# Patient Record
Sex: FEMALE | Race: WHITE | NOT HISPANIC OR LATINO | Employment: UNEMPLOYED | URBAN - METROPOLITAN AREA
[De-identification: names, ages, dates, MRNs, and addresses within clinical notes are randomized per-mention and may not be internally consistent; named-entity substitution may affect disease eponyms.]

---

## 2017-03-09 ENCOUNTER — HOSPITAL ENCOUNTER (EMERGENCY)
Facility: HOSPITAL | Age: 42
Discharge: LEFT BEFORE DISCHARGE | End: 2017-03-09

## 2017-03-09 VITALS
RESPIRATION RATE: 17 BRPM | TEMPERATURE: 99.5 F | BODY MASS INDEX: 32.93 KG/M2 | HEIGHT: 70 IN | DIASTOLIC BLOOD PRESSURE: 92 MMHG | HEART RATE: 120 BPM | SYSTOLIC BLOOD PRESSURE: 133 MMHG | WEIGHT: 230 LBS | OXYGEN SATURATION: 98 %

## 2017-03-09 PROCEDURE — 99211 OFF/OP EST MAY X REQ PHY/QHP: CPT

## 2017-03-09 RX ORDER — LAMOTRIGINE 100 MG/1
150 TABLET ORAL DAILY
COMMUNITY
End: 2017-07-06 | Stop reason: SDUPTHER

## 2017-03-09 RX ORDER — HYDROXYZINE PAMOATE 50 MG/1
50 CAPSULE ORAL 3 TIMES DAILY PRN
COMMUNITY

## 2017-03-09 NOTE — ED NOTES
States was cleaning her old medications out of medication cabinet last night and accidentally threw away her Haldol. Missed her noon dose today and needs it filled      Charissa Escobar RN  03/09/17 9201

## 2017-05-10 ENCOUNTER — OFFICE VISIT (OUTPATIENT)
Dept: FAMILY MEDICINE CLINIC | Facility: CLINIC | Age: 42
End: 2017-05-10

## 2017-05-10 VITALS
SYSTOLIC BLOOD PRESSURE: 130 MMHG | DIASTOLIC BLOOD PRESSURE: 76 MMHG | WEIGHT: 244 LBS | BODY MASS INDEX: 34.93 KG/M2 | HEART RATE: 105 BPM | HEIGHT: 70 IN | OXYGEN SATURATION: 98 % | RESPIRATION RATE: 16 BRPM | TEMPERATURE: 98.5 F

## 2017-05-10 DIAGNOSIS — L73.2 HYDRADENITIS: Primary | ICD-10-CM

## 2017-05-10 DIAGNOSIS — L03.90 CELLULITIS, UNSPECIFIED CELLULITIS SITE: ICD-10-CM

## 2017-05-10 PROCEDURE — 99213 OFFICE O/P EST LOW 20 MIN: CPT | Performed by: NURSE PRACTITIONER

## 2017-05-10 RX ORDER — LITHIUM CARBONATE 300 MG/1
TABLET, FILM COATED, EXTENDED RELEASE ORAL
Refills: 3 | COMMUNITY
Start: 2017-04-10 | End: 2017-07-06 | Stop reason: SDUPTHER

## 2017-05-10 RX ORDER — AMOXICILLIN AND CLAVULANATE POTASSIUM 875; 125 MG/1; MG/1
1 TABLET, FILM COATED ORAL 2 TIMES DAILY
Qty: 20 TABLET | Refills: 0 | Status: SHIPPED | OUTPATIENT
Start: 2017-05-10 | End: 2017-05-20

## 2017-05-10 RX ORDER — METHYLPREDNISOLONE 4 MG/1
TABLET ORAL
Qty: 21 TABLET | Refills: 0 | Status: SHIPPED | OUTPATIENT
Start: 2017-05-10 | End: 2017-07-06

## 2017-05-16 ENCOUNTER — HOSPITAL ENCOUNTER (EMERGENCY)
Facility: HOSPITAL | Age: 42
Discharge: LEFT BEFORE DISCHARGE | End: 2017-05-16

## 2017-05-16 VITALS
SYSTOLIC BLOOD PRESSURE: 149 MMHG | WEIGHT: 235 LBS | RESPIRATION RATE: 20 BRPM | TEMPERATURE: 98.7 F | BODY MASS INDEX: 33.64 KG/M2 | DIASTOLIC BLOOD PRESSURE: 100 MMHG | OXYGEN SATURATION: 98 % | HEART RATE: 131 BPM | HEIGHT: 70 IN

## 2017-05-16 PROCEDURE — 99211 OFF/OP EST MAY X REQ PHY/QHP: CPT

## 2017-05-16 RX ORDER — SODIUM CHLORIDE 0.9 % (FLUSH) 0.9 %
10 SYRINGE (ML) INJECTION AS NEEDED
Status: DISCONTINUED | OUTPATIENT
Start: 2017-05-16 | End: 2017-05-17 | Stop reason: HOSPADM

## 2017-07-06 ENCOUNTER — HOSPITAL ENCOUNTER (EMERGENCY)
Facility: HOSPITAL | Age: 42
Discharge: PSYCHIATRIC OR SUBSTANCE USE FACILITY | End: 2017-07-07
Attending: FAMILY MEDICINE | Admitting: FAMILY MEDICINE

## 2017-07-06 DIAGNOSIS — T43.502A: Primary | ICD-10-CM

## 2017-07-06 DIAGNOSIS — F33.2 SEVERE EPISODE OF RECURRENT MAJOR DEPRESSIVE DISORDER, WITHOUT PSYCHOTIC FEATURES (HCC): ICD-10-CM

## 2017-07-06 LAB
ALBUMIN SERPL-MCNC: 4.7 G/DL (ref 3.5–5.2)
ALBUMIN/GLOB SERPL: 2 G/DL
ALP SERPL-CCNC: 74 U/L (ref 39–117)
ALT SERPL W P-5'-P-CCNC: 16 U/L (ref 1–33)
AMPHET+METHAMPHET UR QL: NEGATIVE
ANION GAP SERPL CALCULATED.3IONS-SCNC: 17.4 MMOL/L
APAP SERPL-MCNC: <5 MCG/ML (ref 10–30)
AST SERPL-CCNC: 10 U/L (ref 1–32)
BARBITURATES UR QL SCN: NEGATIVE
BASOPHILS # BLD AUTO: 0.03 10*3/MM3 (ref 0–0.2)
BASOPHILS NFR BLD AUTO: 0.3 % (ref 0–1.5)
BENZODIAZ UR QL SCN: NEGATIVE
BILIRUB SERPL-MCNC: 0.2 MG/DL (ref 0.1–1.2)
BUN BLD-MCNC: 21 MG/DL (ref 6–20)
BUN/CREAT SERPL: 19.4 (ref 7–25)
CALCIUM SPEC-SCNC: 9.7 MG/DL (ref 8.6–10.5)
CANNABINOIDS SERPL QL: NEGATIVE
CHLORIDE SERPL-SCNC: 101 MMOL/L (ref 98–107)
CO2 SERPL-SCNC: 18.6 MMOL/L (ref 22–29)
COCAINE UR QL: NEGATIVE
CREAT BLD-MCNC: 1.08 MG/DL (ref 0.57–1)
DEPRECATED RDW RBC AUTO: 43.8 FL (ref 37–54)
EOSINOPHIL # BLD AUTO: 0.22 10*3/MM3 (ref 0–0.7)
EOSINOPHIL NFR BLD AUTO: 2.3 % (ref 0.3–6.2)
ERYTHROCYTE [DISTWIDTH] IN BLOOD BY AUTOMATED COUNT: 14.5 % (ref 11.7–13)
ETHANOL BLD-MCNC: <10 MG/DL (ref 0–10)
ETHANOL UR QL: <0.01 %
GFR SERPL CREATININE-BSD FRML MDRD: 55 ML/MIN/1.73
GLOBULIN UR ELPH-MCNC: 2.3 GM/DL
GLUCOSE BLD-MCNC: 121 MG/DL (ref 65–99)
HCG SERPL QL: NEGATIVE
HCT VFR BLD AUTO: 40.4 % (ref 35.6–45.5)
HGB BLD-MCNC: 13.6 G/DL (ref 11.9–15.5)
HOLD SPECIMEN: NORMAL
IMM GRANULOCYTES # BLD: 0.03 10*3/MM3 (ref 0–0.03)
IMM GRANULOCYTES NFR BLD: 0.3 % (ref 0–0.5)
LITHIUM SERPL-SCNC: 0.5 MMOL/L (ref 0.6–1.2)
LYMPHOCYTES # BLD AUTO: 2.82 10*3/MM3 (ref 0.9–4.8)
LYMPHOCYTES NFR BLD AUTO: 29.1 % (ref 19.6–45.3)
MCH RBC QN AUTO: 27.8 PG (ref 26.9–32)
MCHC RBC AUTO-ENTMCNC: 33.7 G/DL (ref 32.4–36.3)
MCV RBC AUTO: 82.4 FL (ref 80.5–98.2)
METHADONE UR QL SCN: NEGATIVE
MONOCYTES # BLD AUTO: 0.63 10*3/MM3 (ref 0.2–1.2)
MONOCYTES NFR BLD AUTO: 6.5 % (ref 5–12)
NEUTROPHILS # BLD AUTO: 5.96 10*3/MM3 (ref 1.9–8.1)
NEUTROPHILS NFR BLD AUTO: 61.5 % (ref 42.7–76)
OPIATES UR QL: NEGATIVE
OXYCODONE UR QL SCN: NEGATIVE
PLATELET # BLD AUTO: 243 10*3/MM3 (ref 140–500)
PMV BLD AUTO: 10.2 FL (ref 6–12)
POTASSIUM BLD-SCNC: 3.5 MMOL/L (ref 3.5–5.2)
PROT SERPL-MCNC: 7 G/DL (ref 6–8.5)
RBC # BLD AUTO: 4.9 10*6/MM3 (ref 3.9–5.2)
SALICYLATES SERPL-MCNC: <0.3 MG/DL
SODIUM BLD-SCNC: 137 MMOL/L (ref 136–145)
VALPROATE SERPL-MCNC: <2.8 MCG/ML (ref 50–125)
WBC NRBC COR # BLD: 9.69 10*3/MM3 (ref 4.5–10.7)
WHOLE BLOOD HOLD SPECIMEN: NORMAL
WHOLE BLOOD HOLD SPECIMEN: NORMAL

## 2017-07-06 PROCEDURE — 93010 ELECTROCARDIOGRAM REPORT: CPT | Performed by: INTERNAL MEDICINE

## 2017-07-06 RX ORDER — LITHIUM CARBONATE 300 MG/1
300 TABLET, FILM COATED, EXTENDED RELEASE ORAL 3 TIMES DAILY
COMMUNITY
End: 2021-05-17

## 2017-07-06 RX ORDER — LITHIUM CARBONATE 300 MG/1
300 TABLET, FILM COATED, EXTENDED RELEASE ORAL 2 TIMES DAILY
COMMUNITY
End: 2017-07-06

## 2017-07-06 RX ORDER — HALOPERIDOL 5 MG/1
5 TABLET ORAL DAILY
Status: ON HOLD | COMMUNITY
End: 2017-07-07

## 2017-07-06 RX ORDER — LAMOTRIGINE 100 MG/1
150 TABLET ORAL EVERY MORNING
COMMUNITY
End: 2021-05-17 | Stop reason: SDUPTHER

## 2017-07-06 RX ORDER — CHLORPROMAZINE HYDROCHLORIDE 50 MG/1
50 TABLET, FILM COATED ORAL 3 TIMES DAILY
COMMUNITY
End: 2019-06-11

## 2017-07-06 RX ORDER — SODIUM CHLORIDE 0.9 % (FLUSH) 0.9 %
10 SYRINGE (ML) INJECTION AS NEEDED
Status: DISCONTINUED | OUTPATIENT
Start: 2017-07-06 | End: 2017-07-07 | Stop reason: HOSPADM

## 2017-07-06 RX ORDER — HALOPERIDOL 10 MG/1
10 TABLET ORAL DAILY
Status: ON HOLD | COMMUNITY
End: 2017-07-07

## 2017-07-06 RX ADMIN — SODIUM CHLORIDE 1000 ML: 9 INJECTION, SOLUTION INTRAVENOUS at 18:20

## 2017-07-06 NOTE — ED PROVIDER NOTES
Pt presents to the ED with a drug overdose onset PTA.  Pt states she took 90 50mg of thorozene at 1700. Pt denies being nauseated. She states she cannot move and can barely talk, but actually is able to give a fairly good history.  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 Her  expresses surprise at the turn of events.  Past History: Schizo affective disorder.  Soc history: Smokes.  Occasional ETOH.  ROS: negative   Physical Exam: pt is tachycardic with a good BP.  She has clear lungs and benign abdomen and no peripheral edema.  Her neuro exam is non focal with mild sedation.    EKG           EKG time: 1805  Rhythm/Rate: sinus tachycardia  P waves and WY: nml  QRS, axis: RAD   ST and T waves: non specific ST and T changes     Patient observed and remained stable.  Due to sedation, charcoal was not given.  IVF's were.    She has steadily improved and Psych here to evaluate her depression (0213).      Diagnosis: 1)Depression, severe, recurrent, without psychosis                    2)Overdose of antipsychotic, intentional    Disposition: Psych to try to transfer to [facility] at patient's request.    0240: Care turned over to Dr Vital.        Interpreted Contemporaneously by me, independently viewed  unchanged compared to prior 7/19/2016    I supervised care provided by the midlevel provider.    We have discussed this patient's history, physical exam, and treatment plan.   I have reviewed the note and personally saw and examined the patient and agree with the plan of care.  Documentation assistance provided by bridgett Figueroa.  Information recorded by the scribe was done at my direction and has been verified and validated by me.         Esther Figueroa  07/06/17 3547       Noel Enciso MD  07/07/17 0154       Noel Enciso MD  07/07/17 0208       Noel Enciso MD  07/07/17 0242

## 2017-07-06 NOTE — ED NOTES
Pt has bipolar and schizophrenia, pt states she that she isolates herself and just becomes depressed, pt stated it is hard to manage her disease and she is tired of going to the hospital for treatment       Agnes Ramirez RN  07/06/17 2216

## 2017-07-06 NOTE — ED PROVIDER NOTES
EMERGENCY DEPARTMENT ENCOUNTER    CHIEF COMPLAINT  Chief Complaint: Drug Overdose  History given by: Patient  History limited by:   Room Number: 03/03  PMD: Bhavik Aguilera MD      HPI:  Pt is a 41 y.o. female who presents complaining of ***    Duration:  ***  Onset: ***  Timing: ***  Location: ***  Radiation: ***  Quality: ***  Intensity/Severity: ***  Progression: ***  Associated Symptoms: ***  Aggravating Factors: ***  Alleviating Factors: ***  Previous Episodes: ***  Treatment before arrival: ***    PAST MEDICAL HISTORY  Active Ambulatory Problems     Diagnosis Date Noted   • Acute paranoia 04/12/2016   • Bipolar affective disorder, currently active 04/12/2016   • Paranoid schizophrenia 08/17/2016     Resolved Ambulatory Problems     Diagnosis Date Noted   • No Resolved Ambulatory Problems     Past Medical History:   Diagnosis Date   • Anxiety    • Bipolar disorder    • Depression    • GERD (gastroesophageal reflux disease)    • Hidradenitis    • Schizo affective schizophrenia        PAST SURGICAL HISTORY  Past Surgical History:   Procedure Laterality Date   • CHOLECYSTECTOMY     • CYST REMOVAL     • LIPOMA EXCISION         FAMILY HISTORY  Family History   Problem Relation Age of Onset   • Cancer Mother    • Hypertension Father        SOCIAL HISTORY  Social History     Social History   • Marital status:      Spouse name: N/A   • Number of children: N/A   • Years of education: N/A     Occupational History   • Not on file.     Social History Main Topics   • Smoking status: Current Every Day Smoker     Packs/day: 1.00   • Smokeless tobacco: Never Used   • Alcohol use No   • Drug use: No   • Sexual activity: Defer     Other Topics Concern   • Not on file     Social History Narrative       ALLERGIES  Review of patient's allergies indicates no known allergies.    REVIEW OF SYSTEMS  Review of Systems    PHYSICAL EXAM  ED Triage Vitals   Temp Heart Rate Resp BP SpO2   07/06/17 1759 07/06/17 1759 07/06/17 1759  07/06/17 1759 07/06/17 1759   99 °F (37.2 °C) 145 16 135/85 99 %      Temp src Heart Rate Source Patient Position BP Location FiO2 (%)   -- -- -- -- --              Physical Exam    LAB RESULTS  Lab Results (last 24 hours)     ** No results found for the last 24 hours. **          I ordered the above labs and reviewed the results    RADIOLOGY  No orders to display        I ordered the above noted radiological studies. Interpreted by radiologist. Discussed with radiologist (***). Reviewed by me in PACS.       PROCEDURES  Procedures      PROGRESS AND CONSULTS  ED Course           MEDICAL DECISION MAKING  Results were reviewed/discussed with the patient and they were also made aware of online access. Pt also made aware that some labs, such as cultures, will not be resulted during ER visit and follow up with PMD is necessary.     MDM       DIAGNOSIS  Final diagnoses:   None       DISPOSITION  ***    Latest Documented Vital Signs:  As of 6:03 PM  BP- 135/85 HR- (!) 145 Temp- 99 °F (37.2 °C) O2 sat- 99%    --  Documentation assistance provided by bridgett Slaughter for James Merritt PA-C.  Information recorded by the bridgett was done at my direction and has been verified and validated by me.     Al Slaughter  07/06/17 0804

## 2017-07-06 NOTE — ED NOTES
Pt states at 1700 she took 4.5 grams of Thorazine in an attempt to kill herself. (quantity of 90 tablets, 50mg each).      Yvette Macias RN  07/06/17 5743

## 2017-07-07 ENCOUNTER — HOSPITAL ENCOUNTER (INPATIENT)
Facility: HOSPITAL | Age: 42
LOS: 2 days | Discharge: HOME | End: 2017-07-09
Attending: SPECIALIST | Admitting: SPECIALIST

## 2017-07-07 VITALS
RESPIRATION RATE: 16 BRPM | HEART RATE: 110 BPM | SYSTOLIC BLOOD PRESSURE: 139 MMHG | TEMPERATURE: 99 F | WEIGHT: 240 LBS | DIASTOLIC BLOOD PRESSURE: 92 MMHG | BODY MASS INDEX: 34.36 KG/M2 | HEIGHT: 70 IN | OXYGEN SATURATION: 99 %

## 2017-07-07 PROBLEM — F31.4 BIPOLAR DISORDER WITH SEVERE DEPRESSION (HCC): Status: ACTIVE | Noted: 2017-07-07

## 2017-07-07 LAB
ANION GAP SERPL CALCULATED.3IONS-SCNC: 18 MMOL/L
BUN BLD-MCNC: 16 MG/DL (ref 6–20)
BUN/CREAT SERPL: 14.8 (ref 7–25)
CALCIUM SPEC-SCNC: 9.7 MG/DL (ref 8.6–10.5)
CHLORIDE SERPL-SCNC: 109 MMOL/L (ref 98–107)
CHOLEST SERPL-MCNC: 123 MG/DL (ref 0–200)
CO2 SERPL-SCNC: 17 MMOL/L (ref 22–29)
CREAT BLD-MCNC: 1.08 MG/DL (ref 0.57–1)
GFR SERPL CREATININE-BSD FRML MDRD: 55 ML/MIN/1.73
GLUCOSE BLD-MCNC: 100 MG/DL (ref 65–99)
HDLC SERPL-MCNC: 38 MG/DL (ref 40–60)
LDLC SERPL CALC-MCNC: 64 MG/DL (ref 0–100)
LDLC/HDLC SERPL: 1.69 {RATIO}
POTASSIUM BLD-SCNC: 3.8 MMOL/L (ref 3.5–5.2)
SODIUM BLD-SCNC: 144 MMOL/L (ref 136–145)
TRIGL SERPL-MCNC: 104 MG/DL (ref 0–150)
VLDLC SERPL-MCNC: 20.8 MG/DL (ref 5–40)

## 2017-07-07 PROCEDURE — 80048 BASIC METABOLIC PNL TOTAL CA: CPT | Performed by: HOSPITALIST

## 2017-07-07 PROCEDURE — 80061 LIPID PANEL: CPT | Performed by: SPECIALIST

## 2017-07-07 RX ORDER — HYDROXYZINE PAMOATE 25 MG/1
50 CAPSULE ORAL 3 TIMES DAILY PRN
Status: DISCONTINUED | OUTPATIENT
Start: 2017-07-07 | End: 2017-07-09 | Stop reason: HOSPADM

## 2017-07-07 RX ORDER — ALUMINA, MAGNESIA, AND SIMETHICONE 2400; 2400; 240 MG/30ML; MG/30ML; MG/30ML
15 SUSPENSION ORAL EVERY 6 HOURS PRN
Status: DISCONTINUED | OUTPATIENT
Start: 2017-07-07 | End: 2017-07-09 | Stop reason: HOSPADM

## 2017-07-07 RX ORDER — LITHIUM CARBONATE 300 MG/1
300 TABLET, FILM COATED, EXTENDED RELEASE ORAL 3 TIMES DAILY
Status: DISCONTINUED | OUTPATIENT
Start: 2017-07-07 | End: 2017-07-09 | Stop reason: HOSPADM

## 2017-07-07 RX ORDER — CHLORPROMAZINE HYDROCHLORIDE 50 MG/1
50 TABLET, FILM COATED ORAL 3 TIMES DAILY
Status: DISCONTINUED | OUTPATIENT
Start: 2017-07-07 | End: 2017-07-09 | Stop reason: HOSPADM

## 2017-07-07 RX ORDER — TOPIRAMATE 100 MG/1
200 TABLET, FILM COATED ORAL NIGHTLY
Status: DISCONTINUED | OUTPATIENT
Start: 2017-07-07 | End: 2017-07-09 | Stop reason: HOSPADM

## 2017-07-07 RX ORDER — DOXEPIN HYDROCHLORIDE 50 MG/1
200 CAPSULE ORAL NIGHTLY
Status: DISCONTINUED | OUTPATIENT
Start: 2017-07-07 | End: 2017-07-09 | Stop reason: HOSPADM

## 2017-07-07 RX ORDER — NICOTINE 21 MG/24HR
1 PATCH, TRANSDERMAL 24 HOURS TRANSDERMAL EVERY 24 HOURS
Status: DISCONTINUED | OUTPATIENT
Start: 2017-07-07 | End: 2017-07-09 | Stop reason: HOSPADM

## 2017-07-07 RX ORDER — ACETAMINOPHEN 325 MG/1
650 TABLET ORAL EVERY 4 HOURS PRN
Status: DISCONTINUED | OUTPATIENT
Start: 2017-07-07 | End: 2017-07-09 | Stop reason: HOSPADM

## 2017-07-07 RX ADMIN — CHLORPROMAZINE HYDROCHLORIDE 50 MG: 50 TABLET, SUGAR COATED ORAL at 21:01

## 2017-07-07 RX ADMIN — TOPIRAMATE 200 MG: 100 TABLET, FILM COATED ORAL at 21:01

## 2017-07-07 RX ADMIN — LITHIUM CARBONATE 300 MG: 300 TABLET, EXTENDED RELEASE ORAL at 12:03

## 2017-07-07 RX ADMIN — CHLORPROMAZINE HYDROCHLORIDE 50 MG: 50 TABLET, SUGAR COATED ORAL at 16:47

## 2017-07-07 RX ADMIN — LITHIUM CARBONATE 300 MG: 300 TABLET, EXTENDED RELEASE ORAL at 21:01

## 2017-07-07 RX ADMIN — LITHIUM CARBONATE 300 MG: 300 TABLET, EXTENDED RELEASE ORAL at 16:47

## 2017-07-07 RX ADMIN — DOXEPIN HYDROCHLORIDE 200 MG: 50 CAPSULE ORAL at 21:01

## 2017-07-07 RX ADMIN — HYDROXYZINE PAMOATE 50 MG: 25 CAPSULE ORAL at 17:28

## 2017-07-07 RX ADMIN — CHLORPROMAZINE HYDROCHLORIDE 50 MG: 50 TABLET, SUGAR COATED ORAL at 12:03

## 2017-07-07 RX ADMIN — LAMOTRIGINE 150 MG: 100 TABLET ORAL at 12:02

## 2017-07-07 RX ADMIN — NICOTINE 1 PATCH: 21 PATCH, EXTENDED RELEASE TRANSDERMAL at 12:27

## 2017-07-07 NOTE — H&P
40 yo wf admitted after ingestion of CPZ.  Hx of Schiazoaffective d/o, multiple prev admits to [facility], followed by Dr Torres's office.  Recently learned of mo's dx w terminal CA  -- reprts that this is the major stressor    PPHx: as above    PMHx: obese    MEDS: Abilify depot, CPZ, KIM, TOP, LiCO3    All: none    FAM: noncontrib    SOC: lives w husb, no SA issues, disabled by illness    MSE AAOx4, mood dysphoric, affect flat. +SI/-HI, no psychotic syx, J&I impaired    IMP: F25.0    PLAN: Restart meds, ind, fam,  therapy, SP    ELOS:7 d

## 2017-07-07 NOTE — PROGRESS NOTES
Clinical Pharmacy Services: Medication History    Veronica Colby is a 41 y.o. female presenting to [facility] Emergency Department with chief complaint of: Drug Overdose    Past Medical History:  Past Medical History:   Diagnosis Date   • Anxiety    • Bipolar disorder    • Depression    • GERD (gastroesophageal reflux disease)    • Hidradenitis    • Schizo affective schizophrenia        No Known Allergies    Medication information was obtained from: Patient pharmacy  Pharmacy and Phone Number: [facility] [phone]    Medication notes:   The following changes were made to the patient PTA.   1. Divalproex was removed from the patients PTA, due to the fact that they the pharmacy reported that patient had not filled since 10/27/16  2. Haloperidol was changed to 5mg once at 4am and 10mg daily, in accordance with patients pharmacy records  3. Apex prescription was increased to 900mg daily ( 600mg in am and 300mg at bedtime) however, the patient had not picked up this prescription yet, and had picked up the previous prescription for 300mg twice daily (600mg total daily dose)   4. Oxazepam was taken off PTA because it had been >12 months since patient had last filled     Current Outpatient Medications:    Prior to Admission medications    Medication Sig Start Date End Date Taking? Authorizing Provider   Aripiprazole Lauroxil ER (ARISTADA) 882 MG/3.2ML prefilled syringe Inject  into the shoulder, thigh, or buttocks.   Yes Historical Provider, MD   chlorproMAZINE (THORAZINE) 50 MG tablet Take 50 mg by mouth 3 (Three) Times a Day.   Yes Historical Provider, MD   doxepin (SINEquan) 100 MG capsule Take 200 mg by mouth every night. Two at bedtime 2/14/16  Yes Historical Provider, MD   haloperidol (HALDOL) 10 MG tablet Take 10 mg by mouth Daily.   Yes Historical Provider, MD   haloperidol (HALDOL) 5 MG tablet Take 5 mg by mouth Daily. Once daily at 4pm   Yes Historical Provider, MD   hydrOXYzine (VISTARIL) 50 MG capsule Take  50 mg by mouth 3 (Three) Times a Day As Needed for Anxiety.   Yes Historical Provider, MD   lamoTRIgine (LaMICtal) 100 MG tablet Take 150 mg by mouth Every Morning.   Yes Historical Provider, MD   lithium (ESKALITH) 450 MG CR tablet 300 mg. Take 2 in am and 1 at bedtime 2/3/16  Yes Historical Provider, MD   topiramate (TOPAMAX) 200 MG tablet Take 200 mg by mouth Every Night. 2/14/16  Yes Historical Provider, MD   ARIPiprazole (ABILIFY) 15 MG tablet TK 1 T PO  HS 3/18/16 7/6/17 Yes Historical Provider, MD   divalproex (DEPAKOTE) 500 MG DR tablet Take 500 mg by mouth 3 (three) times a day.  7/6/17 Yes Historical Provider, MD   lamoTRIgine (LaMICtal) 100 MG tablet Take 150 mg by mouth Daily.  7/6/17 Yes Historical Provider, MD   lithium (LITHOBID) 300 MG CR tablet Take 300 mg by mouth 2 (Two) Times a Day.  7/6/17 Yes Historical Provider, MD   doxycycline (VIBRAMYCIN) 100 MG capsule Take 100 mg by mouth 2 (Two) Times a Day. 2/14/16   Historical Provider, MD   Aripiprazole Lauroxil  MG/2.4ML prefilled syringe Inject  into the shoulder, thigh, or buttocks every 30 (thirty) days.  7/6/17  Historical Provider, MD   chlorproMAZINE (THORAZINE) 100 MG tablet TK 1 T PO  BID 4/5/16 7/6/17  Historical Provider, MD   haloperidol (HALDOL) 5 MG tablet Take 5 mg by mouth 3 (three) times a day.  7/6/17  Historical Provider, MD   hydrOXYzine (ATARAX) 50 MG tablet Take 50 mg by mouth 3 (three) times a day as needed for itching.  7/6/17  Historical Provider, MD   lithium (LITHOBID) 300 MG CR tablet TK 1 T PO BID 4/10/17 7/6/17  Historical Provider, MD   MethylPREDNISolone (MEDROL, MATTHEW,) 4 MG tablet Take as directed on package instructions. 5/10/17 7/6/17  CANDY Mead   oxazepam (SERAX) 15 MG capsule 15 in am and 45 in pm   2/8/16 7/6/17  Historical Provider, MD       This medication list is complete to the best of my knowledge as of 7/6/2017    Please call if questions.     Edy Cross, Student Pharmacist

## 2017-07-07 NOTE — PLAN OF CARE
Problem: BH Patient Care Overview (Adult)  Goal: Interdisciplinary Rounds/Family Conference  Outcome: Ongoing (interventions implemented as appropriate)    07/07/17 0940   Interdisciplinary Rounds/Family Conf   Summary Treatment team met to discuss plan of care. Plan for individual ssign  ession and couples session. Plan to continue with HUAN Bhakta. Possible candidate for [facility] outpatient program.   Participants ;nursing;pharmacy;psychiatrist;social work      Patient/Guardian Signature: __________________________________             Psychiatrist Signature: ______________________________________             Therapist Signature: ________________________________________              Nurse Signature: ___________________________________________              Staff Signature: ____________________________________________             Staff Signature: ____________________________________________              Staff Signature: ____________________________________________              Staff Signature:                                                                                                      Goal: Individualization and Mutuality  Outcome: Ongoing (interventions implemented as appropriate)    07/07/17 0940   Behavioral Health Screens   Patient Personal Strengths independent living skills;stable living environment         Problem: Depression  Goal: Treatment Goal: Demonstrate behavioral control of depressive symptoms, verbalize feelings of improved mood/affect, and adopt new coping skills prior to discharge  Outcome: Ongoing (interventions implemented as appropriate)  Goal: Verbalize thoughts and feelings associated with:  Outcome: Ongoing (interventions implemented as appropriate)  Goal: Refrain from harming self  Outcome: Ongoing (interventions implemented as appropriate)  Goal: Refrain from isolation  Outcome: Ongoing (interventions implemented as appropriate)  Goal: Refrain from  self-neglect  Outcome: Ongoing (interventions implemented as appropriate)  Goal: Attend and participate in unit activities, including therapeutic, recreational, and educational groups  Outcome: Ongoing (interventions implemented as appropriate)  Goal: Complete daily ADLs, including personal hygiene independently, as able  Outcome: Ongoing (interventions implemented as appropriate)

## 2017-07-07 NOTE — PLAN OF CARE
Problem:  Patient Care Overview (Adult)  Goal: Plan of Care Review  Outcome: Ongoing (interventions implemented as appropriate)    07/07/17 0535 07/07/17 0605   Patient Care Overview   Progress --  no change   Outcome Evaluation   Outcome Summary/Follow up Plan --  pt is cooperative. pt expresses 10/10 anxiety, 5/10 depression, and SI. pt denies pain, HI, and hallucinations. Will continue to monitor.    Coping/Psychosocial   Patient Agreement with Plan of Care agrees --    Coping/Psychosocial Response Interventions   Plan Of Care Reviewed With patient --          Problem:  Overarching Goals  Goal: Adheres to Safety Considerations for Self and Others  Outcome: Ongoing (interventions implemented as appropriate)  Goal: Optimized Coping Skills in Response to Life Stressors  Outcome: Ongoing (interventions implemented as appropriate)  Goal: Develops/Participates in Therapeutic [place] to Support Successful Transition  Outcome: Ongoing (interventions implemented as appropriate)

## 2017-07-07 NOTE — CONSULTS
"42 yo white female evaluated in ED (Room#7) BIB EMS from home s/p intentional overdose on 4.5 grams of thorazine at 1700 on 7/6/2017 and then called the crisis hotline for help. Patient states she was trying to kill herself. Patient states \"i'm tired of days with nothing to do\". States she doesn't work and her  does.  16 years, no children. History of multiple psychiatric admissions at [facility] under Dr. Cartwright with most recent around [place]. Outpatient with ZORA Ruiz at [facility]. States she has sat in her car in the garage with motor running and garage door shut multiple times with intent to die but then gets out of the car after about 5 minutes. Continues to voice SI and wanting to die. No HI. No psychosis. States she is compliant with her medications. Denies alcohol or drug abuse. Smokes 1 PPD cigarettes.  Tyrone was at bedside but has went home. Affect flat, blunted. States she would like to be transferred to [facility] with Dr. Cartwright.   "

## 2017-07-07 NOTE — CONSULTS
[facility]  [phone]    CONSULT NOTE    INTERNAL MEDICINE   [facility]     Referring Provider: Derrick Lofton*  Reason for Consultation: Medical evaluation psychiatric patient  Chief complaint: depression    Subjective .     History of present illness:  This is a 41-year-old female history of bipolar disorder who presents to the hospital with suicidal ideation and attempted overdose.  She has a history of some reflux problems as well as hidradenitis but is otherwise medically healthy.  She denies any history of high blood pressure high cholesterol or other medical issues.  She does smoke and is not interested in quitting at the present time.  She presented the hospital yesterday after an intentional overdose.    Review of Systems  Review of Systems - History obtained from the patient  General ROS: negative for - chills, fatigue or fever  Psychological ROS: negative for - anxiety, behavioral disorder or concentration difficulties  Ophthalmic ROS: negative for - blurry vision, decreased vision or double vision  ENT ROS: negative for - epistaxis, headaches or hearing change  Allergy and Immunology ROS: negative for - nasal congestion or seasonal allergies  Hematological and Lymphatic ROS: negative for - bleeding problems, blood clots or blood transfusions  Endocrine ROS: negative for - mood swings, skin changes or temperature intolerance  Respiratory ROS: no cough, shortness of breath, or wheezing  Cardiovascular ROS: no chest pain or dyspnea on exertion  Gastrointestinal ROS: no abdominal pain, change in bowel habits, or black or bloody stools  Genito-Urinary ROS: no dysuria, trouble voiding, or hematuria  Musculoskeletal ROS: negative for - gait disturbance, joint pain or joint stiffness  Neurological ROS: no TIA or stroke symptoms  Dermatological ROS: negative for dry skin, rash and skin lesion changes      Past Medical History:   Diagnosis Date   •  Anxiety    • Bipolar disorder    • Depression    • GERD (gastroesophageal reflux disease)    • Hidradenitis    • Schizo affective schizophrenia      Past Surgical History:   Procedure Laterality Date   • CHOLECYSTECTOMY     • CYST REMOVAL     • LIPOMA EXCISION       Family History   Problem Relation Age of Onset   • Cancer Mother    • Hypertension Father      Social History   Substance Use Topics   • Smoking status: Current Every Day Smoker     Packs/day: 1.00   • Smokeless tobacco: Never Used   • Alcohol use No     Prescriptions Prior to Admission   Medication Sig Dispense Refill Last Dose   • Aripiprazole Lauroxil ER (ARISTADA) 882 MG/3.2ML prefilled syringe Inject  into the shoulder, thigh, or buttocks.      • chlorproMAZINE (THORAZINE) 50 MG tablet Take 50 mg by mouth 3 (Three) Times a Day.      • doxepin (SINEquan) 100 MG capsule Take 200 mg by mouth every night. Two at bedtime  2 Taking   • doxycycline (VIBRAMYCIN) 100 MG capsule Take 100 mg by mouth 2 (Two) Times a Day.  5 Unknown at Unknown time   • haloperidol (HALDOL) 10 MG tablet Take 10 mg by mouth Daily.      • haloperidol (HALDOL) 5 MG tablet Take 5 mg by mouth Daily. Once daily at 4pm      • hydrOXYzine (VISTARIL) 50 MG capsule Take 50 mg by mouth 3 (Three) Times a Day As Needed for Anxiety.      • lamoTRIgine (LaMICtal) 100 MG tablet Take 150 mg by mouth Every Morning.      • lithium (LITHOBID) 300 MG CR tablet Take 300 mg by mouth 3 (Three) Times a Day. 2 in am and 1 at bedtime      • topiramate (TOPAMAX) 200 MG tablet Take 200 mg by mouth Every Night.  3 Taking       chlorproMAZINE 50 mg Oral TID   doxepin 200 mg Oral Nightly   lamoTRIgine 150 mg Oral QAM   lithium 300 mg Oral TID   nicotine 1 patch Transdermal Q24H   topiramate 200 mg Oral Nightly     Allergies:   Review of patient's allergies indicates no known allergies.    Objective     Vital Signs   Temp:  [99 °F (37.2 °C)] 99 °F (37.2 °C)  Heart Rate:  [] 120  Resp:  [16] 16  BP:  ()/() 158/96  No intake or output data in the 24 hours ending 07/07/17 1209  Flowsheet Rows         First Filed Value    Admission Height      Admission Weight  222 lb 11.2 oz (101 kg) Documented at 07/07/2017 0525          Physical Exam:     General Appearance:    Alert, cooperative, in no acute distress   Head:    Normocephalic, without obvious abnormality, atraumatic   Eyes:            Lids and lashes normal, conjunctivae and sclerae normal, no   icterus, no pallor, corneas clear, PERRLA   Ears:    Ears appear intact with no abnormalities noted   Throat:   No oral lesions, no thrush, oral mucosa moist   Neck:   No adenopathy, supple, trachea midline, no thyromegaly, no   carotid bruit, no JVD   Back:     No kyphosis present, no scoliosis present, no skin lesions,      erythema or scars, no tenderness to percussion or                   palpation,   range of motion normal   Lungs:     Clear to auscultation,respirations regular, even and                  unlabored    Heart:    Regular rhythm and normal rate, normal S1 and S2, no            murmur, no gallop, no rub, no click   Chest Wall:    No abnormalities observed   Abdomen:     Normal bowel sounds, no masses, no organomegaly, soft        non-tender, non-distended, no guarding, no rebound                tenderness   Rectal:     Deferred   Extremities:   Moves all extremities well, no edema, no cyanosis, no             redness   Pulses:   Pulses palpable and equal bilaterally   Skin:   No bleeding, bruising or rash   Lymph nodes:   No palpable adenopathy   Neurologic:   Cranial nerves 2 - 12 grossly intact, sensation intact, DTR       present and equal bilaterally       Results Review:   I reviewed the patient's new clinical results.  I reviewed the patient's new imaging results and agree with the interpretation.  I reviewed the patient's other test results and agree with the interpretation      Results from last 7 days  Lab Units 07/06/17  1811   WBC  10*3/mm3 9.69   HEMOGLOBIN g/dL 13.6   HEMATOCRIT % 40.4   PLATELETS 10*3/mm3 243       Results from last 7 days  Lab Units 07/07/17  1232   SODIUM mmol/L 144   POTASSIUM mmol/L 3.8   CHLORIDE mmol/L 109*   CO2 mmol/L 17.0*   BUN mg/dL 16   CREATININE mg/dL 1.08*   GLUCOSE mg/dL 100*   CALCIUM mg/dL 9.7       Results from last 7 days  Lab Units 07/06/17  1811   ALK PHOS U/L 74   BILIRUBIN mg/dL 0.2   ALT (SGPT) U/L 16   AST (SGOT) U/L 10       Assessment/Plan     Active Problems:    Bipolar disorder with severe depression    At this point there is a lack of any medical issues to address.  She does have a metabolic acidosis but this is likely secondary to aggressive IV fluids and the Thorazine that she took on admission.  As long as she feels well I don't see a reason to repeat the labs.  She denies any other symptoms or complaints.  She has a nicotine patch and does not show a desire to quit smoking at present.  She did have mild elevation in her blood sugar yesterday where blood sugars only 100 this morning.  She can follow-up with her primary care physician to address the potential for diabetes or prediabetes.  Fasting lipid panel done this morning shows no hyperlipidemia.  Otherwise we'll sign off today please call back with any questions or concerns.      I discussed the patients findings and my recommendations with patient, family and nursing staff    Thank you very much for allowing us to participate in your patient's care.    Solitario Cain MD  07/07/17  12:09 PM    Time: 30mins

## 2017-07-08 RX ORDER — BUPROPION HYDROCHLORIDE 150 MG/1
150 TABLET ORAL DAILY
Status: DISCONTINUED | OUTPATIENT
Start: 2017-07-08 | End: 2017-07-09 | Stop reason: HOSPADM

## 2017-07-08 RX ADMIN — DOXEPIN HYDROCHLORIDE 200 MG: 50 CAPSULE ORAL at 20:08

## 2017-07-08 RX ADMIN — NICOTINE 1 PATCH: 21 PATCH, EXTENDED RELEASE TRANSDERMAL at 06:50

## 2017-07-08 RX ADMIN — BUPROPION HYDROCHLORIDE 150 MG: 150 TABLET, EXTENDED RELEASE ORAL at 17:05

## 2017-07-08 RX ADMIN — LITHIUM CARBONATE 300 MG: 300 TABLET, EXTENDED RELEASE ORAL at 20:08

## 2017-07-08 RX ADMIN — CHLORPROMAZINE HYDROCHLORIDE 50 MG: 50 TABLET, SUGAR COATED ORAL at 17:05

## 2017-07-08 RX ADMIN — LAMOTRIGINE 150 MG: 100 TABLET ORAL at 06:51

## 2017-07-08 RX ADMIN — LITHIUM CARBONATE 300 MG: 300 TABLET, EXTENDED RELEASE ORAL at 08:58

## 2017-07-08 RX ADMIN — CHLORPROMAZINE HYDROCHLORIDE 50 MG: 50 TABLET, SUGAR COATED ORAL at 20:08

## 2017-07-08 RX ADMIN — CHLORPROMAZINE HYDROCHLORIDE 50 MG: 50 TABLET, SUGAR COATED ORAL at 08:58

## 2017-07-08 RX ADMIN — TOPIRAMATE 200 MG: 100 TABLET, FILM COATED ORAL at 20:07

## 2017-07-08 RX ADMIN — HYDROXYZINE PAMOATE 50 MG: 25 CAPSULE ORAL at 15:12

## 2017-07-08 RX ADMIN — LITHIUM CARBONATE 300 MG: 300 TABLET, EXTENDED RELEASE ORAL at 17:05

## 2017-07-08 NOTE — PLAN OF CARE
Problem:  Patient Care Overview (Adult)  Goal: Plan of Care Review  Outcome: Ongoing (interventions implemented as appropriate)    07/08/17 0455   Patient Care Overview   Progress improving   Outcome Evaluation   Outcome Summary/Follow up Plan Pt states improvement in both anxiety 3/10, and depression 3/10. Pt denies SI,HI and pain. Sleeping well overnight. No other complaints at this time. Will continue to monitor.   Coping/Psychosocial Response Interventions   Plan Of Care Reviewed With patient   Coping/Psychosocial   Patient Agreement with Plan of Care agrees         Problem:  Overarching Goals  Goal: Optimized Coping Skills in Response to Life Stressors  Outcome: Ongoing (interventions implemented as appropriate)

## 2017-07-08 NOTE — PROGRESS NOTES
Calm, requests reinitiation of BUP which was recently started at [facility] in AM    Denies SI today

## 2017-07-08 NOTE — PLAN OF CARE
Problem:  Patient Care Overview (Adult)  Goal: Plan of Care Review  Outcome: Ongoing (interventions implemented as appropriate)    07/08/17 1403   Patient Care Overview   Progress improving   Outcome Evaluation   Outcome Summary/Follow up Plan Pt rated axniety 2/10, depression 1/10, and denied SI, HI, hallucinations, and pain. Pt has family session scheduled tomorrow. Pt complianed of left eye irritation. Pt instructed to put warm rag on eye, no further complaints were made. Pt is guarded but cooperative. Pt is medication compliant and attending groups. Will continue to monitor and encourage verbalization of needs and emotions.    Coping/Psychosocial Response Interventions   Plan Of Care Reviewed With patient   Coping/Psychosocial   Patient Agreement with Plan of Care agrees       Goal: Interdisciplinary Rounds/Family Conference  Outcome: Ongoing (interventions implemented as appropriate)  Goal: Individualization and Mutuality  Outcome: Ongoing (interventions implemented as appropriate)  Goal: Discharge Needs Assessment  Outcome: Ongoing (interventions implemented as appropriate)    Problem:  Overarching Goals  Goal: Adheres to Safety Considerations for Self and Others  Outcome: Ongoing (interventions implemented as appropriate)  Intervention: Develop/maintain Individualized Safety Plan    07/08/17 0930 07/08/17 1200   Mental Health Homicide Risk   Homicidal Ideation no --    Willingness to Contact Staff Member if Feeling Like Hurting Others yes --    Safety   Safety Measures --  safety rounds completed   Provide Emotional/Physical Safety   Suicidal Ideation no --    Willingness to Contact Staff Member if Feeling Like Hurting Self yes --          Goal: Optimized Coping Skills in Response to Life Stressors  Outcome: Ongoing (interventions implemented as appropriate)  Intervention: Promote Effective Coping Strategies    07/08/17 0930   Coping Strategies   Supportive Measures active listening  utilized;decision-making supported;goal setting facilitated;self-care encouraged;verbalization of feelings encouraged         Goal: Develops/Participates in Therapeutic [place] to Support Successful Transition  Outcome: Ongoing (interventions implemented as appropriate)  Intervention: Foster Therapeutic [place]    07/08/17 0930   Coping Strategies   Trust Relationship/Rapport care explained;choices provided;emotional support provided;questions answered;empathic listening provided;questions encouraged;reassurance provided;thoughts/feelings acknowledged       Intervention: Mutually Develop Transition Plan    07/08/17 0930   OTHER   Transition Support crisis management plan promoted

## 2017-07-09 VITALS
SYSTOLIC BLOOD PRESSURE: 115 MMHG | WEIGHT: 222.7 LBS | OXYGEN SATURATION: 100 % | RESPIRATION RATE: 18 BRPM | HEART RATE: 101 BPM | DIASTOLIC BLOOD PRESSURE: 75 MMHG | BODY MASS INDEX: 31.95 KG/M2

## 2017-07-09 RX ORDER — BUPROPION HYDROCHLORIDE 150 MG/1
150 TABLET ORAL DAILY
Qty: 30 TABLET | Refills: 0 | Status: SHIPPED | OUTPATIENT
Start: 2017-07-09 | End: 2017-07-09

## 2017-07-09 RX ORDER — BUPROPION HYDROCHLORIDE 150 MG/1
150 TABLET ORAL DAILY
Qty: 30 TABLET | Refills: 0 | Status: SHIPPED | OUTPATIENT
Start: 2017-07-09 | End: 2019-06-11

## 2017-07-09 RX ORDER — NICOTINE 21 MG/24HR
1 PATCH, TRANSDERMAL 24 HOURS TRANSDERMAL EVERY 24 HOURS
Qty: 7 PATCH | Refills: 0 | Status: SHIPPED | OUTPATIENT
Start: 2017-07-09 | End: 2018-08-27 | Stop reason: SDUPTHER

## 2017-07-09 RX ADMIN — BUPROPION HYDROCHLORIDE 150 MG: 150 TABLET, EXTENDED RELEASE ORAL at 09:20

## 2017-07-09 RX ADMIN — CHLORPROMAZINE HYDROCHLORIDE 50 MG: 50 TABLET, SUGAR COATED ORAL at 09:20

## 2017-07-09 RX ADMIN — LAMOTRIGINE 150 MG: 100 TABLET ORAL at 06:28

## 2017-07-09 RX ADMIN — NICOTINE 1 PATCH: 21 PATCH, EXTENDED RELEASE TRANSDERMAL at 06:28

## 2017-07-09 RX ADMIN — LITHIUM CARBONATE 300 MG: 300 TABLET, EXTENDED RELEASE ORAL at 09:20

## 2017-07-09 NOTE — SIGNIFICANT NOTE
Met with pt and  for family session. Pt reported feeling better with positive thoughts, positive outlook and with the med adjustment. Pt and  shared of history of Bipolar illness including time before got . Couple processed how pt related the first time was hospitalized was  wanting pt to get help.  shared how pt had not been working for years after having issues with not connecting with coworkers and having disappointments at work. Pt shared on continued therapy weekly and how had tried support groups in the past, but has been having issues with feeling lonely and low energy at home. Explored ways to increase communication with , journal feelings and include  in decision to get help. Pt reported had gone to [facility] recently to help address depression related to mother's CA DX and did not tell  pt was in the IOP.  shared wanting to be supportive of pt getting help and wants to be part of helping. Pt asked  with filling a pill box for the week and locking up the rest of the meds to reassure  would not O D again.Pt shared plan to try support groups again, share journaling with  and continue with out pt therapy.

## 2017-07-09 NOTE — DISCHARGE SUMMARY
40 yo wf w hx of schizoaffective d/o admitted p ingestion of CPZ    HOSP COURSE: admitted to [facility], placed on SP. Meds restarted and BUP XL added (hx of prev response).  Depressive syx resolved rapidly, had - marital tx session on 7/9/17 -- for DC thereafter    FINAL DX: f25.0    DISP: f/u w Dr. Torres's office    MEDS: LiCO3 300mg, BUP XL 300mg, CPZ 50mg, DOX 200mg, Vistaril 50mg    PROG: good

## 2017-07-09 NOTE — PLAN OF CARE
Problem: [facility] Patient Care Overview (Adult)  Goal: Plan of Care Review  Outcome: Ongoing (interventions implemented as appropriate)    07/09/17 0404   Patient Care Overview   Progress improving   Outcome Evaluation   Outcome Summary/Follow up Plan Pt rating Anxiety 0/10, Depression 0/10, denies SI, HI, and hallucinations, sleeping throughout night. Pt cooperative with medications, remains guarded. No complaints of pain. Will continue to monitor.    Coping/Psychosocial Response Interventions   Plan Of Care Reviewed With patient   Coping/Psychosocial   Patient Agreement with Plan of Care agrees         Problem:  Overarching Goals  Goal: Adheres to Safety Considerations for Self and Others  Outcome: Ongoing (interventions implemented as appropriate)    Problem: Depression  Goal: Refrain from isolation  Outcome: Ongoing (interventions implemented as appropriate)

## 2017-07-10 ENCOUNTER — TELEPHONE (OUTPATIENT)
Dept: PSYCHIATRY | Facility: HOSPITAL | Age: 42
End: 2017-07-10

## 2017-07-10 NOTE — PROGRESS NOTES
Continued Stay Note  [facility]     Patient Name: Veronica Colby  MRN: [id number]  Today's Date: 7/10/2017    Admit Date: 7/7/2017          Discharge Plan       07/10/17 1439    Case Management/Social Work Plan    Plan Home    Final Note    Final Note Pt D/C'ed home 7/9 per Dr. Smith's orders.     Call placed to pt [phone] to discuss aftercare follow up.  Pt at first wanted to follow up at [facility] but then phoned back to access center stating that she did not want PHP.    Pt stated that she does have an appt Wednesday 7/12 with Esme PRESSLEY  but was not able to provide time. Call placed and  left for [employer] [phone]  to confirm appt time and date.    Pt also stated that she will be following up at [facility] [phone] in 1 week with her therapist.                  Discharge Codes     None        Expected Discharge Date and Time     Expected Discharge Date Expected Discharge Time    Jul 9, 2017  4:07 PM            MACRINA Encarnacion

## 2017-07-11 ENCOUNTER — TELEPHONE (OUTPATIENT)
Dept: PSYCHIATRY | Facility: HOSPITAL | Age: 42
End: 2017-07-11

## 2017-07-11 NOTE — TELEPHONE ENCOUNTER
"Patient reports she is doing \"pretty good.\"  She states she was able to fill her scripts and understands her discharge instructions.  She was complimentary of staff and requested no further assistance.  "

## 2017-11-17 ENCOUNTER — OFFICE VISIT (OUTPATIENT)
Dept: FAMILY MEDICINE CLINIC | Facility: CLINIC | Age: 42
End: 2017-11-17

## 2017-11-17 VITALS
SYSTOLIC BLOOD PRESSURE: 130 MMHG | TEMPERATURE: 97.9 F | DIASTOLIC BLOOD PRESSURE: 83 MMHG | BODY MASS INDEX: 35.93 KG/M2 | OXYGEN SATURATION: 98 % | RESPIRATION RATE: 18 BRPM | WEIGHT: 251 LBS | HEIGHT: 70 IN | HEART RATE: 103 BPM

## 2017-11-17 DIAGNOSIS — K21.9 GASTROESOPHAGEAL REFLUX DISEASE, ESOPHAGITIS PRESENCE NOT SPECIFIED: Primary | ICD-10-CM

## 2017-11-17 PROCEDURE — 99213 OFFICE O/P EST LOW 20 MIN: CPT | Performed by: NURSE PRACTITIONER

## 2017-11-17 RX ORDER — OMEPRAZOLE 40 MG/1
40 CAPSULE, DELAYED RELEASE ORAL DAILY
Qty: 30 CAPSULE | Refills: 2 | Status: SHIPPED | OUTPATIENT
Start: 2017-11-17 | End: 2021-05-17

## 2017-11-17 RX ORDER — CLOZAPINE 100 MG/1
300 TABLET ORAL DAILY
COMMUNITY

## 2017-11-17 NOTE — PROGRESS NOTES
Subjective   Veronica Colby is a 42 y.o. female.     History of Present Illness   Veronica Colby 42 y.o. female who presents for evaluation of nausea, abdominal pain and GERD. Symptoms have been present for several weeks .  The condition is aggravated by caffeine and diet sodas . she is experiencing no other GI signs or symptoms.  Alleviating factors are antiacids and not eating with caffeine and diet sodas . Patient denies fever, chills, diarrhea, constipation and change in stools her past medical history is notable for GERD.  Patient denies recent travel.  Patient had cholecystectomy.       The following portions of the patient's history were reviewed and updated as appropriate: allergies, current medications, past family history, past medical history, past social history, past surgical history and problem list.    Review of Systems   Constitutional: Negative for chills and fever.   Gastrointestinal: Positive for nausea and vomiting. Negative for abdominal distention, abdominal pain, anal bleeding, blood in stool, constipation, diarrhea and rectal pain.       Objective   Physical Exam   Constitutional: She is oriented to person, place, and time. She appears well-developed and well-nourished.   Cardiovascular: Normal rate and regular rhythm.    Pulmonary/Chest: Effort normal and breath sounds normal.   Abdominal: Normal appearance and bowel sounds are normal. There is no tenderness. There is no rigidity, no rebound, no guarding, no tenderness at McBurney's point and negative Stoner's sign.   Neurological: She is alert and oriented to person, place, and time.   Psychiatric: She has a normal mood and affect. Judgment normal.   Nursing note and vitals reviewed.      Assessment/Plan   Problems Addressed this Visit     None      Visit Diagnoses     Gastroesophageal reflux disease, esophagitis presence not specified    -  Primary    Relevant Medications    omeprazole (PRILOSEC) 40 MG capsule               Patient to take  medication for at least 8 weeks.  She is to avoid fatty or spicy foods and eat bland diet for several days.

## 2018-05-16 NOTE — PLAN OF CARE
Problem:  Patient Care Overview (Adult)  Goal: Plan of Care Review  Outcome: Ongoing (interventions implemented as appropriate)    07/07/17 1715   Patient Care Overview   Progress improving   Outcome Evaluation   Outcome Summary/Follow up Plan The patient rated her anxiety at a 9/10 and her depression at 5/10, with her main stressor being her mother's death. The patient denied any SI, HI, pain, or hallucinations. The patient spent most the day in the lounge and was pleasent. Cooperative with medications. Attending groups. Continuing to monitor.    Coping/Psychosocial Response Interventions   Plan Of Care Reviewed With patient   Coping/Psychosocial   Patient Agreement with Plan of Care agrees       Goal: Interdisciplinary Rounds/Family Conference  Outcome: Ongoing (interventions implemented as appropriate)  Goal: Individualization and Mutuality  Outcome: Ongoing (interventions implemented as appropriate)  Goal: Discharge Needs Assessment  Outcome: Ongoing (interventions implemented as appropriate)    Problem:  Overarching Goals  Goal: Adheres to Safety Considerations for Self and Others  Outcome: Ongoing (interventions implemented as appropriate)  Intervention: Develop/maintain Individualized Safety Plan    07/07/17 1010 07/07/17 1715   Mental Health Homicide Risk   Homicidal Ideation no --    Willingness to Contact Staff Member if Feeling Like Hurting Others --  yes   Safety   Safety Measures safety rounds completed;suicide assessment completed --    Provide Emotional/Physical Safety   Suicidal Ideation no --    Willingness to Contact Staff Member if Feeling Like Hurting Self yes --          Goal: Optimized Coping Skills in Response to Life Stressors  Outcome: Ongoing (interventions implemented as appropriate)  Intervention: Promote Effective Coping Strategies    07/07/17 1010   Coping Strategies   Supportive Measures active listening utilized;relaxation techniques promoted;self-care encouraged;self-reflection  Problem: Patient Care Overview  Goal: Discharge Needs Assessment  Outcome: Adequate for Discharge Date Met: 05/16/18  AVS/DC teaching and medications reviewed with patient. Patient is aware of when to call and follow-up. Patient is aware of resources available.  Aware of [place] scale and when to retake and call.Teaching is completed, no questions. See note regarding breast lump on left breast. Patient is aware to follow up next week if lump is still present. Discharged to home with baby at 1405.       promoted;self-responsibility promoted;verbalization of feelings encouraged         Goal: Develops/Participates in Therapeutic [place] to Support Successful Transition  Outcome: Ongoing (interventions implemented as appropriate)  Intervention: Foster Therapeutic [place]    07/07/17 1010   Coping Strategies   Trust Relationship/Rapport care explained;choices provided;emotional support provided;empathic listening provided;questions answered;questions encouraged;reassurance provided;thoughts/feelings acknowledged

## 2018-08-27 ENCOUNTER — OFFICE VISIT (OUTPATIENT)
Dept: FAMILY MEDICINE CLINIC | Facility: CLINIC | Age: 43
End: 2018-08-27

## 2018-08-27 VITALS
WEIGHT: 254 LBS | RESPIRATION RATE: 18 BRPM | OXYGEN SATURATION: 99 % | SYSTOLIC BLOOD PRESSURE: 126 MMHG | BODY MASS INDEX: 36.36 KG/M2 | HEIGHT: 70 IN | HEART RATE: 97 BPM | TEMPERATURE: 97.9 F | DIASTOLIC BLOOD PRESSURE: 84 MMHG

## 2018-08-27 DIAGNOSIS — Z71.6 ENCOUNTER FOR SMOKING CESSATION COUNSELING: ICD-10-CM

## 2018-08-27 DIAGNOSIS — J06.9 ACUTE URI: ICD-10-CM

## 2018-08-27 DIAGNOSIS — M54.50 ACUTE BILATERAL LOW BACK PAIN WITHOUT SCIATICA: ICD-10-CM

## 2018-08-27 DIAGNOSIS — R11.10 CHRONIC VOMITING: Primary | ICD-10-CM

## 2018-08-27 PROCEDURE — 99214 OFFICE O/P EST MOD 30 MIN: CPT | Performed by: NURSE PRACTITIONER

## 2018-08-27 RX ORDER — NICOTINE 21 MG/24HR
1 PATCH, TRANSDERMAL 24 HOURS TRANSDERMAL EVERY 24 HOURS
Qty: 14 PATCH | Refills: 1 | Status: SHIPPED | OUTPATIENT
Start: 2018-08-27 | End: 2019-06-11 | Stop reason: SDUPTHER

## 2018-08-27 NOTE — PROGRESS NOTES
Subjective   Veronica Colby is a 42 y.o. female.     History of Present Illness   Veronica Colby 42 y.o. female who presents for evaluation of upper respiratory congestion. Symptoms include congestion, post nasal drip, productive cough, headache and runny nose.  Onset of symptoms was 4 days ago, unchanged since that time. Patient denies shortness of breath, wheezing, fever.   Evaluation to date: none Treatment to date:  Dayquil and Nyquil.      Patient taking iron per Dr. Cartwright.  Patient sees Esme Ferraro with psychiatry for her medications.     Reports all over muscle aching and soreness x a couple of months. Reports some days are worse than others.   Also reports bilateral low back pain for a few days.  Worse with flexion and extension.        Veronica Colby 42 y.o. female who presents for evaluation of mid to lower abdomen pain and vomiting for a few months.  Denies nausea prior to emesis. Reports it may happen 3 times per week.  Not necessarily associated with eating.  Bowel movements are regular.  Denies FH of colorectal cancer. . Symptoms have been present for a few months .  The condition is aggravated by nothing . she is experiencing abdominal pain, bloating and vomiting.  Alleviating factors are PPI Rx with no change in symptoms . Patient denies fever, chills and diarrhea her past medical history is notable for GERD.  Patient denies recent travel.         The following portions of the patient's history were reviewed and updated as appropriate: allergies, current medications, past family history, past medical history, past social history, past surgical history and problem list.    Review of Systems   Constitutional: Negative for chills and fever.   HENT: Positive for congestion, postnasal drip, rhinorrhea and sore throat. Negative for ear pain and sinus pressure.    Respiratory: Positive for cough. Negative for chest tightness, shortness of breath and wheezing.    Gastrointestinal: Positive for abdominal pain and  vomiting. Negative for abdominal distention, anal bleeding, blood in stool, constipation, diarrhea, nausea and rectal pain.   Musculoskeletal: Positive for back pain and myalgias. Negative for gait problem.   Neurological: Negative for weakness.       Objective   Physical Exam   Constitutional: She is oriented to person, place, and time. She appears well-developed and well-nourished.   Cardiovascular: Normal rate and regular rhythm.    Pulmonary/Chest: Effort normal and breath sounds normal.   Neurological: She is alert and oriented to person, place, and time.   Psychiatric: She has a normal mood and affect. Judgment normal.   Nursing note and vitals reviewed.      Assessment/Plan   Veronica was seen today for uri, generalized body aches, gi problem and nicotine dependence.    Diagnoses and all orders for this visit:    Chronic vomiting  -     Ambulatory Referral to Gastroenterology    Acute URI    Acute bilateral low back pain without sciatica  -     diclofenac (VOLTAREN) 1 % gel gel; Apply 4 g topically to the appropriate area as directed 4 (Four) Times a Day As Needed (pain).    Encounter for smoking cessation counseling  -     nicotine (NICODERM CQ) 21 MG/24HR patch; Place 1 patch on the skin as directed by provider Daily.

## 2018-12-03 ENCOUNTER — HOSPITAL ENCOUNTER (EMERGENCY)
Facility: HOSPITAL | Age: 43
Discharge: LEFT BEFORE DISCHARGE | End: 2018-12-03

## 2018-12-03 VITALS — OXYGEN SATURATION: 99 % | TEMPERATURE: 97.6 F | RESPIRATION RATE: 18 BRPM | HEART RATE: 108 BPM

## 2019-06-02 DIAGNOSIS — Z71.6 ENCOUNTER FOR SMOKING CESSATION COUNSELING: ICD-10-CM

## 2019-06-03 RX ORDER — NICOTINE 21 MG/24HR
PATCH, TRANSDERMAL 24 HOURS TRANSDERMAL
Qty: 14 PATCH | Refills: 0 | OUTPATIENT
Start: 2019-06-03

## 2019-06-11 ENCOUNTER — OFFICE VISIT (OUTPATIENT)
Dept: FAMILY MEDICINE CLINIC | Facility: CLINIC | Age: 44
End: 2019-06-11

## 2019-06-11 VITALS
RESPIRATION RATE: 24 BRPM | TEMPERATURE: 97.7 F | BODY MASS INDEX: 36.79 KG/M2 | DIASTOLIC BLOOD PRESSURE: 82 MMHG | OXYGEN SATURATION: 99 % | HEIGHT: 70 IN | HEART RATE: 88 BPM | WEIGHT: 257 LBS | SYSTOLIC BLOOD PRESSURE: 120 MMHG

## 2019-06-11 DIAGNOSIS — Z71.6 ENCOUNTER FOR SMOKING CESSATION COUNSELING: ICD-10-CM

## 2019-06-11 PROCEDURE — 99213 OFFICE O/P EST LOW 20 MIN: CPT | Performed by: NURSE PRACTITIONER

## 2019-06-11 RX ORDER — ALBUTEROL SULFATE 90 UG/1
2 AEROSOL, METERED RESPIRATORY (INHALATION) EVERY 4 HOURS PRN
Qty: 1 INHALER | Refills: 0 | Status: SHIPPED | OUTPATIENT
Start: 2019-06-11 | End: 2020-06-02 | Stop reason: SDUPTHER

## 2019-06-11 RX ORDER — NICOTINE 21 MG/24HR
1 PATCH, TRANSDERMAL 24 HOURS TRANSDERMAL EVERY 24 HOURS
Qty: 14 PATCH | Refills: 1 | Status: SHIPPED | OUTPATIENT
Start: 2019-06-11 | End: 2019-09-18

## 2019-06-11 RX ORDER — NICOTINE 21 MG/24HR
1 PATCH, TRANSDERMAL 24 HOURS TRANSDERMAL EVERY 24 HOURS
Qty: 14 PATCH | Refills: 0 | Status: SHIPPED | OUTPATIENT
Start: 2019-06-11 | End: 2021-05-17

## 2019-06-11 NOTE — PROGRESS NOTES
Subjective   Veronica Colby is a 43 y.o. female.     History of Present Illness   Patient presents to office to discuss smoking cessation. She has taken Nicoderm patches in the past with success. She has restarted smoking and would like to get patches refilled.  She denies side effects.      Patient denies cough or congestion but reports some wheezing for a few minutes in the morning upon waking. She denies wheezing or shortness of breath throughout the day.   The following portions of the patient's history were reviewed and updated as appropriate: allergies, current medications, past family history, past medical history, past social history, past surgical history and problem list.    Review of Systems   Constitutional: Negative for unexpected weight change.   Respiratory: Positive for wheezing. Negative for cough and shortness of breath.    Cardiovascular: Negative for chest pain and palpitations.   Endocrine: Negative for cold intolerance, heat intolerance, polydipsia, polyphagia and polyuria.   Psychiatric/Behavioral: Negative for behavioral problems.       Objective   Physical Exam   Constitutional: She is oriented to person, place, and time. She appears well-developed and well-nourished.   Cardiovascular: Normal rate and regular rhythm.   Pulmonary/Chest: Effort normal and breath sounds normal.   Neurological: She is alert and oriented to person, place, and time.   Psychiatric: She has a normal mood and affect. Judgment normal.   Nursing note and vitals reviewed.      Assessment/Plan   Veronica was seen today for wants to quit smoking and shortness of breath.    Diagnoses and all orders for this visit:    Encounter for smoking cessation counseling  -     nicotine (NICODERM CQ) 21 MG/24HR patch; Place 1 patch on the skin as directed by provider Daily.  -     nicotine (NICODERM CQ) 14 MG/24HR patch; Place 1 patch on the skin as directed by provider Daily.  -     nicotine (NICODERM CQ) 7 MG/24HR patch; Place 1 patch on  the skin as directed by provider Daily.    Other orders  -     albuterol sulfate  (90 Base) MCG/ACT inhaler; Inhale 2 puffs Every 4 (Four) Hours As Needed for Wheezing or Shortness of Air.        Patient given printed prescription for 14 mg and 7 mg with instructions how to step down dosing.

## 2019-09-02 RX ORDER — ALBUTEROL SULFATE 90 UG/1
AEROSOL, METERED RESPIRATORY (INHALATION)
Qty: 18 G | Refills: 0 | OUTPATIENT
Start: 2019-09-02

## 2019-09-18 RX ORDER — ARIPIPRAZOLE 30 MG/1
TABLET ORAL DAILY
Refills: 4 | COMMUNITY
Start: 2019-08-01 | End: 2021-11-01 | Stop reason: SDUPTHER

## 2019-09-18 RX ORDER — HYDROXYZINE 50 MG/1
TABLET, FILM COATED ORAL
COMMUNITY
Start: 2019-08-07 | End: 2021-05-17 | Stop reason: SDUPTHER

## 2019-09-18 RX ORDER — LAMOTRIGINE 150 MG/1
300 TABLET ORAL EVERY MORNING
Refills: 4 | COMMUNITY
Start: 2019-08-01

## 2019-09-23 ENCOUNTER — OFFICE VISIT (OUTPATIENT)
Dept: FAMILY MEDICINE CLINIC | Facility: CLINIC | Age: 44
End: 2019-09-23

## 2019-09-23 VITALS
WEIGHT: 240 LBS | HEART RATE: 104 BPM | DIASTOLIC BLOOD PRESSURE: 82 MMHG | TEMPERATURE: 98 F | HEIGHT: 70 IN | SYSTOLIC BLOOD PRESSURE: 126 MMHG | BODY MASS INDEX: 34.36 KG/M2 | RESPIRATION RATE: 20 BRPM

## 2019-09-23 DIAGNOSIS — Z72.0 TOBACCO ABUSE: ICD-10-CM

## 2019-09-23 DIAGNOSIS — M72.2 PLANTAR FASCIITIS, BILATERAL: Primary | ICD-10-CM

## 2019-09-23 PROCEDURE — 99214 OFFICE O/P EST MOD 30 MIN: CPT | Performed by: FAMILY MEDICINE

## 2019-09-23 RX ORDER — VARENICLINE TARTRATE 0.5 MG/1
0.5 TABLET, FILM COATED ORAL 2 TIMES DAILY
Qty: 60 TABLET | Refills: 5 | Status: SHIPPED | OUTPATIENT
Start: 2019-09-23 | End: 2021-05-17

## 2019-09-23 RX ORDER — DOXYCYCLINE HYCLATE 100 MG/1
CAPSULE ORAL
Refills: 5 | COMMUNITY
Start: 2019-09-08

## 2019-09-23 NOTE — PROGRESS NOTES
"Subjective   Veronica Colby is a 43 y.o. female.     CC: Bilateral Foot Pain         Tobacco Abuse           History of Present Illness     Pt comes in today reporting some bilateral foot pain globally w/o known reason and denies injury. Hurts mainly with walking and better with getting off them and elevating. Started 4-5 weeks ago.    Pt has tried numerous modalities with tobacco cessation and desires to try Chantix. Denies SI/HI.    The following portions of the patient's history were reviewed and updated as appropriate: allergies, current medications, past family history, past medical history, past social history, past surgical history and problem list.    Review of Systems   Constitutional: Negative for activity change, chills, fatigue and fever.   Respiratory: Negative for cough and chest tightness.    Cardiovascular: Negative for chest pain and palpitations.   Gastrointestinal: Negative for abdominal pain and nausea.   Endocrine: Negative for cold intolerance.   Musculoskeletal:        Foot pain   Psychiatric/Behavioral: Negative for behavioral problems and dysphoric mood.       /82   Pulse 104   Temp 98 °F (36.7 °C) (Oral)   Resp 20   Ht 177.8 cm (70\")   Wt 109 kg (240 lb)   BMI 34.44 kg/m²     Objective   Physical Exam   Constitutional: She appears well-developed and well-nourished.   Neck: Neck supple. No thyromegaly present.   Cardiovascular: Normal rate and regular rhythm.   No murmur heard.  Pulmonary/Chest: Effort normal and breath sounds normal.   Abdominal: Bowel sounds are normal. There is no tenderness.   Musculoskeletal: She exhibits tenderness (bilateral (L>R) Planar Fascia).   Neurological: She is alert.   Psychiatric: She has a normal mood and affect. Her behavior is normal.   Nursing note and vitals reviewed.      Assessment/Plan   Veronica was seen today for bilateral foot pain and tobacco abuse.    Diagnoses and all orders for this visit:    Plantar fasciitis, bilateral  -     Ambulatory " Referral to Podiatry  -     diclofenac (VOLTAREN) 50 MG EC tablet; Take 1 tablet by mouth 2 (Two) Times a Day.    Tobacco abuse  -     varenicline (CHANTIX) 0.5 MG tablet; Take 1 tablet by mouth 2 (Two) Times a Day.    Stretching and orthotics discussed.

## 2020-01-20 ENCOUNTER — HOSPITAL ENCOUNTER (EMERGENCY)
Facility: HOSPITAL | Age: 45
Discharge: LEFT BEFORE DISCHARGE | End: 2020-01-21

## 2020-01-20 VITALS
BODY MASS INDEX: 34.44 KG/M2 | HEART RATE: 117 BPM | HEIGHT: 70 IN | RESPIRATION RATE: 16 BRPM | OXYGEN SATURATION: 98 % | TEMPERATURE: 99.2 F

## 2020-01-21 ENCOUNTER — OFFICE VISIT (OUTPATIENT)
Dept: FAMILY MEDICINE CLINIC | Facility: CLINIC | Age: 45
End: 2020-01-21

## 2020-01-21 VITALS
HEIGHT: 70 IN | HEART RATE: 105 BPM | RESPIRATION RATE: 16 BRPM | TEMPERATURE: 98.2 F | OXYGEN SATURATION: 98 % | DIASTOLIC BLOOD PRESSURE: 80 MMHG | SYSTOLIC BLOOD PRESSURE: 120 MMHG | WEIGHT: 240 LBS | BODY MASS INDEX: 34.36 KG/M2

## 2020-01-21 DIAGNOSIS — R22.0 SUBMANDIBULAR SWELLING: Primary | ICD-10-CM

## 2020-01-21 DIAGNOSIS — R22.1 SUBMANDIBULAR SWELLING: Primary | ICD-10-CM

## 2020-01-21 DIAGNOSIS — M54.2 NECK PAIN: ICD-10-CM

## 2020-01-21 PROCEDURE — 99214 OFFICE O/P EST MOD 30 MIN: CPT | Performed by: NURSE PRACTITIONER

## 2020-01-21 RX ORDER — AMOXICILLIN AND CLAVULANATE POTASSIUM 875; 125 MG/1; MG/1
1 TABLET, FILM COATED ORAL 2 TIMES DAILY
Qty: 20 TABLET | Refills: 0 | Status: SHIPPED | OUTPATIENT
Start: 2020-01-21 | End: 2020-01-31

## 2020-01-21 NOTE — PROGRESS NOTES
Subjective   Veronica Colby is a 44 y.o. female.     History of Present Illness   Patient presents to office for left facial pain and swelling x 3 days.  Reports having dental procedures a couple of months ago including root canal.  She has had left side dental pain off and on since but no swelling until 3 days ago.  She has not followed up with dentist yet.  Denies fever, chills.      Patient also reports neck stiffness and popping sensation when turning her head. Symptoms present for 2 weeks.  Denies prior issues.   The following portions of the patient's history were reviewed and updated as appropriate: allergies, current medications, past family history, past medical history, past social history, past surgical history and problem list.    Review of Systems   Constitutional: Negative for chills, fever and unexpected weight change.   HENT: Positive for dental problem and facial swelling. Negative for mouth sores.    Respiratory: Negative for shortness of breath.    Cardiovascular: Negative for chest pain and palpitations.   Musculoskeletal: Positive for neck pain and neck stiffness.   Neurological: Negative for weakness, numbness and headaches.   Psychiatric/Behavioral: Negative for behavioral problems.       Objective   Physical Exam   Constitutional: She is oriented to person, place, and time. She appears well-developed and well-nourished.   HENT:   Head:       No abscess or oral lesion appreciated.  Swelling and 2 cm palpable mass to left submandibular area consistent with enlarged lymph node.    Neck: Normal range of motion. No spinous process tenderness and no muscular tenderness present. No neck rigidity. No edema, no erythema and normal range of motion present.   No crepitus appreciated on exam. Full ROM but patient reported some pain with turning head to left and right shoulder.    Pulmonary/Chest: Effort normal.   Neurological: She is alert and oriented to person, place, and time.   Psychiatric: She has a  normal mood and affect. Judgment normal.   Nursing note and vitals reviewed.      Assessment/Plan   Veronica was seen today for neck pain, facial swelling and dental pain.    Diagnoses and all orders for this visit:    Submandibular swelling  -     amoxicillin-clavulanate (AUGMENTIN) 875-125 MG per tablet; Take 1 tablet by mouth 2 (Two) Times a Day for 10 days.    Neck pain  -     diclofenac (VOLTAREN) 1 % gel gel; Apply 4 g topically to the appropriate area as directed 4 (Four) Times a Day As Needed (pain).      Patient to contact dental office today for appointment. If she is unable to be seen in the next few days, she will make f/u appt here.

## 2020-04-21 DIAGNOSIS — M54.2 NECK PAIN: ICD-10-CM

## 2020-06-02 ENCOUNTER — OFFICE VISIT (OUTPATIENT)
Dept: FAMILY MEDICINE CLINIC | Facility: CLINIC | Age: 45
End: 2020-06-02

## 2020-06-02 VITALS
SYSTOLIC BLOOD PRESSURE: 110 MMHG | TEMPERATURE: 97.1 F | RESPIRATION RATE: 16 BRPM | OXYGEN SATURATION: 98 % | HEIGHT: 70 IN | DIASTOLIC BLOOD PRESSURE: 60 MMHG | HEART RATE: 101 BPM | WEIGHT: 240 LBS | BODY MASS INDEX: 34.36 KG/M2

## 2020-06-02 DIAGNOSIS — M54.2 NECK PAIN: Primary | ICD-10-CM

## 2020-06-02 DIAGNOSIS — M72.2 PLANTAR FASCIITIS, LEFT: ICD-10-CM

## 2020-06-02 DIAGNOSIS — J45.20 MILD INTERMITTENT ASTHMA WITHOUT COMPLICATION: ICD-10-CM

## 2020-06-02 PROCEDURE — 99214 OFFICE O/P EST MOD 30 MIN: CPT | Performed by: NURSE PRACTITIONER

## 2020-06-02 RX ORDER — ALBUTEROL SULFATE 90 UG/1
2 AEROSOL, METERED RESPIRATORY (INHALATION) EVERY 4 HOURS PRN
Qty: 1 INHALER | Refills: 1 | Status: SHIPPED | OUTPATIENT
Start: 2020-06-02 | End: 2021-11-18

## 2020-06-02 RX ORDER — METHYLPREDNISOLONE 4 MG/1
TABLET ORAL
Qty: 21 TABLET | Refills: 0 | Status: SHIPPED | OUTPATIENT
Start: 2020-06-02 | End: 2021-05-17

## 2020-06-02 NOTE — PROGRESS NOTES
Subjective   Veronica Colby is a 44 y.o. female.     History of Present Illness   Patient complains of left heel area pain. The symptoms began a few months ago.  Pain is a result of no known event.  Discomfort is described as aching. Symptoms are exacerbated by getting up to walk after sitting for prolonged periods or after walking for prolonged periods.  Evaluation to date: none. Therapy to date includes: nothing specific      Patient also reports some neck pain and popping for several months.  Denies limited ROM.  Reports some intermittent headaches that she thinks is related.  She has not yet seen chiropractor for this.     Needs refill on albuterol inhaler as it has . She has not needed to use it.   The following portions of the patient's history were reviewed and updated as appropriate: allergies, current medications, past family history, past medical history, past social history, past surgical history and problem list.    Review of Systems   Constitutional: Negative for unexpected weight change.   Respiratory: Negative for cough and shortness of breath.    Cardiovascular: Negative for chest pain and palpitations.   Musculoskeletal: Positive for neck pain and neck stiffness. Negative for arthralgias and joint swelling.   Neurological: Positive for headaches.   Psychiatric/Behavioral: Negative for behavioral problems.       Objective   Physical Exam   Constitutional: She is oriented to person, place, and time. She appears well-developed and well-nourished.   Neck: Normal range of motion present.   Pulmonary/Chest: Effort normal.   Musculoskeletal:        Left foot: There is tenderness. There is normal range of motion, no bony tenderness, no swelling, normal capillary refill, no crepitus, no deformity and no laceration.        Neurological: She is alert and oriented to person, place, and time.   Psychiatric: She has a normal mood and affect. Her behavior is normal. Judgment and thought content normal.      Nursing note and vitals reviewed.      Assessment/Plan   Veronica was seen today for foot pain, neck pain and asthma.    Diagnoses and all orders for this visit:    Neck pain  -     methylPREDNISolone (MEDROL, MATTHEW,) 4 MG tablet; follow package directions    Plantar fasciitis, left  -     methylPREDNISolone (MEDROL, MATTHEW,) 4 MG tablet; follow package directions    Mild intermittent asthma without complication  -     albuterol sulfate  (90 Base) MCG/ACT inhaler; Inhale 2 puffs Every 4 (Four) Hours As Needed for Wheezing or Shortness of Air.          Gave patient referral information for Dr. Velásquez with [facility].

## 2020-09-17 ENCOUNTER — OFFICE VISIT (OUTPATIENT)
Dept: FAMILY MEDICINE CLINIC | Facility: CLINIC | Age: 45
End: 2020-09-17

## 2020-09-17 VITALS
TEMPERATURE: 97.7 F | WEIGHT: 236 LBS | SYSTOLIC BLOOD PRESSURE: 131 MMHG | HEIGHT: 70 IN | RESPIRATION RATE: 16 BRPM | HEART RATE: 100 BPM | DIASTOLIC BLOOD PRESSURE: 89 MMHG | BODY MASS INDEX: 33.79 KG/M2 | OXYGEN SATURATION: 98 %

## 2020-09-17 DIAGNOSIS — M79.672 LEFT FOOT PAIN: ICD-10-CM

## 2020-09-17 DIAGNOSIS — L84 CALLUS OF FOOT: ICD-10-CM

## 2020-09-17 DIAGNOSIS — M79.672 PAIN OF BOTH HEELS: Primary | ICD-10-CM

## 2020-09-17 DIAGNOSIS — M79.671 PAIN OF BOTH HEELS: Primary | ICD-10-CM

## 2020-09-17 PROCEDURE — 73630 X-RAY EXAM OF FOOT: CPT | Performed by: FAMILY MEDICINE

## 2020-09-17 PROCEDURE — 99213 OFFICE O/P EST LOW 20 MIN: CPT | Performed by: FAMILY MEDICINE

## 2020-09-17 NOTE — PROGRESS NOTES
"   Subjective       Chief Complaint   Patient presents with   • Foot Pain     both          HPI:       This patient presents for bilateral foot pain left greater than right over the past 6 months that is progressive.  Pain is worse with walking.  She has a callus on the right lateral foot and compensates with walking due to this.  Pain is usually with weightbearing.  No relieving symptoms.  Pain involves heels and also lateral aspect of left foot.  No history of injury.  History of Present Illness       Review of Systems   Cardiovascular: Negative for leg swelling.   Musculoskeletal:        See hpi      I have reviewed the ROS as documented above. Bhavik Aguilera MD              PE:   Objective   /89   Pulse 100   Temp 97.7 °F (36.5 °C) (Tympanic)   Resp 16   Ht 177.8 cm (70\")   Wt 107 kg (236 lb)   SpO2 98%   BMI 33.86 kg/m²  Body mass index is 33.86 kg/m².    Physical Exam  Musculoskeletal:      Right foot: Normal range of motion. No deformity or bunion.      Left foot: Normal range of motion. No deformity or bunion.        Feet:    Feet:      Right foot:      Skin integrity: Callus present.      Left foot:      Skin integrity: No callus.         Procedures      Data Reviewed:      Encounter Date that the following test/s were ordered and reviewed by Bhavik Aguilera MD: 09/17/2020     Xray test ordered: Right Heel, Left Heel and Left Foot  Views: lateral and posterior-anterior    Relevant Clinical Issues/Diagnoses/Indications for XRay: see HPI  Clinical Findings: Extremities: xrays negative    Compared with previous XRay? no    Date of Previous Xray:No previous xray available for comparison    Changes on current Xray? not applicable              A/P:     Assessment/Plan   Diagnoses and all orders for this visit:    1. Pain of both heels (Primary)  -     XR Calcaneus 2+ View Bilateral (In Office)    2. Callus of foot  -     Ambulatory Referral to Podiatry    3. Left foot pain  -     XR Foot 3+ View Left " (In Office)          Follow up:    Return if symptoms worsen or fail to improve.

## 2020-09-17 NOTE — PATIENT INSTRUCTIONS
Plantar Fasciitis    Plantar fasciitis is a painful foot condition that affects the heel. It occurs when the band of tissue that connects the toes to the heel bone (plantar fascia) becomes irritated. This can happen as the result of exercising too much or doing other repetitive activities (overuse injury).  The pain from plantar fasciitis can range from mild irritation to severe pain that makes it difficult to walk or move. The pain is usually worse in the morning after sleeping, or after sitting or lying down for a while. Pain may also be worse after long periods of walking or standing.  What are the causes?  This condition may be caused by:  · Standing for long periods of time.  · Wearing shoes that do not have good arch support.  · Doing activities that put stress on joints (high-impact activities), including running, aerobics, and ballet.  · Being overweight.  · An abnormal way of walking (gait).  · Tight muscles in the back of your lower leg (calf).  · High arches in your feet.  · Starting a new athletic activity.  What are the signs or symptoms?  The main symptom of this condition is heel pain. Pain may:  · Be worse with first steps after a time of rest, especially in the morning after sleeping or after you have been sitting or lying down for a while.  · Be worse after long periods of standing still.  · Decrease after 30-45 minutes of activity, such as gentle walking.  How is this diagnosed?  This condition may be diagnosed based on your medical history and your symptoms. Your health care provider may ask questions about your activity level. Your health care provider will do a physical exam to check for:  · A tender area on the bottom of your foot.  · A high arch in your foot.  · Pain when you move your foot.  · Difficulty moving your foot.  You may have imaging tests to confirm the diagnosis, such as:  · X-rays.  · Ultrasound.  · MRI.  How is this treated?  Treatment for plantar fasciitis depends on how  severe your condition is. Treatment may include:  · Rest, ice, applying pressure (compression), and raising the affected foot (elevation). This may be called RICE therapy. Your health care provider may recommend RICE therapy along with over-the-counter pain medicines to manage your pain.  · Exercises to stretch your calves and your plantar fascia.  · A splint that holds your foot in a stretched, upward position while you sleep (night splint).  · Physical therapy to relieve symptoms and prevent problems in the future.  · Injections of steroid medicine (cortisone) to relieve pain and inflammation.  · Stimulating your plantar fascia with electrical impulses (extracorporeal shock wave therapy). This is usually the last treatment option before surgery.  · Surgery, if other treatments have not worked after 12 months.  Follow these instructions at home:    Managing pain, stiffness, and swelling  · If directed, put ice on the painful area:  ? Put ice in a plastic bag, or use a frozen bottle of water.  ? Place a towel between your skin and the bag or bottle.  ? Roll the bottom of your foot over the bag or bottle.  ? Do this for 20 minutes, 2-3 times a day.  · Wear athletic shoes that have air-sole or gel-sole cushions, or try wearing soft shoe inserts that are designed for plantar fasciitis.  · Raise (elevate) your foot above the level of your heart while you are sitting or lying down.  Activity  · Avoid activities that cause pain. Ask your health care provider what activities are safe for you.  · Do physical therapy exercises and stretches as told by your health care provider.  · Try activities and forms of exercise that are easier on your joints (low-impact). Examples include swimming, water aerobics, and biking.  General instructions  · Take over-the-counter and prescription medicines only as told by your health care provider.  · Wear a night splint while sleeping, if told by your health care provider. Loosen the splint  if your toes tingle, become numb, or turn cold and blue.  · Maintain a healthy weight, or work with your health care provider to lose weight as needed.  · Keep all follow-up visits as told by your health care provider. This is important.  Contact a health care provider if you:  · Have symptoms that do not go away after caring for yourself at home.  · Have pain that gets worse.  · Have pain that affects your ability to move or do your daily activities.  Summary  · Plantar fasciitis is a painful foot condition that affects the heel. It occurs when the band of tissue that connects the toes to the heel bone (plantar fascia) becomes irritated.  · The main symptom of this condition is heel pain that may be worse after exercising too much or standing still for a long time.  · Treatment varies, but it usually starts with rest, ice, compression, and elevation (RICE therapy) and over-the-counter medicines to manage pain.  This information is not intended to replace advice given to you by your health care provider. Make sure you discuss any questions you have with your health care provider.  Document Released: 09/12/2002 Document Revised: 11/30/2018 Document Reviewed: 10/15/2018  [employer] Patient Education © 2020 [employer]

## 2021-03-11 ENCOUNTER — TELEPHONE (OUTPATIENT)
Dept: FAMILY MEDICINE CLINIC | Facility: CLINIC | Age: 46
End: 2021-03-11

## 2021-03-11 ENCOUNTER — DOCUMENTATION (OUTPATIENT)
Dept: FAMILY MEDICINE CLINIC | Facility: CLINIC | Age: 46
End: 2021-03-11

## 2021-05-11 ENCOUNTER — TELEPHONE (OUTPATIENT)
Dept: FAMILY MEDICINE CLINIC | Facility: CLINIC | Age: 46
End: 2021-05-11

## 2021-05-11 NOTE — TELEPHONE ENCOUNTER
----- Message from Bhavik Aguilera MD sent at 5/10/2021  3:49 PM EDT -----  Call to have patient set up appointment to discuss recent outside lab result abnormalities

## 2021-05-17 ENCOUNTER — OFFICE VISIT (OUTPATIENT)
Dept: FAMILY MEDICINE CLINIC | Facility: CLINIC | Age: 46
End: 2021-05-17

## 2021-05-17 VITALS
SYSTOLIC BLOOD PRESSURE: 136 MMHG | OXYGEN SATURATION: 96 % | BODY MASS INDEX: 31.21 KG/M2 | TEMPERATURE: 97.8 F | DIASTOLIC BLOOD PRESSURE: 96 MMHG | WEIGHT: 218 LBS | HEART RATE: 103 BPM | RESPIRATION RATE: 16 BRPM | HEIGHT: 70 IN

## 2021-05-17 DIAGNOSIS — I10 ESSENTIAL HYPERTENSION: Primary | ICD-10-CM

## 2021-05-17 DIAGNOSIS — R79.89 ELEVATED SERUM CREATININE: ICD-10-CM

## 2021-05-17 PROBLEM — M79.672 LEFT FOOT PAIN: Status: RESOLVED | Noted: 2020-09-17 | Resolved: 2021-05-17

## 2021-05-17 PROBLEM — M79.671 PAIN OF BOTH HEELS: Status: RESOLVED | Noted: 2020-09-17 | Resolved: 2021-05-17

## 2021-05-17 PROBLEM — M79.672 PAIN OF BOTH HEELS: Status: RESOLVED | Noted: 2020-09-17 | Resolved: 2021-05-17

## 2021-05-17 PROBLEM — L84 CALLUS OF FOOT: Status: RESOLVED | Noted: 2020-09-17 | Resolved: 2021-05-17

## 2021-05-17 PROCEDURE — 99214 OFFICE O/P EST MOD 30 MIN: CPT | Performed by: FAMILY MEDICINE

## 2021-05-17 RX ORDER — AMLODIPINE BESYLATE 2.5 MG/1
2.5 TABLET ORAL DAILY
Qty: 90 TABLET | Refills: 1 | Status: SHIPPED | OUTPATIENT
Start: 2021-05-17 | End: 2021-11-02

## 2021-05-17 RX ORDER — COPPER 313.4 MG/1
1 INTRAUTERINE DEVICE INTRAUTERINE
COMMUNITY

## 2021-05-17 RX ORDER — CLINDAMYCIN PHOSPHATE 10 UG/ML
LOTION TOPICAL
COMMUNITY
Start: 2021-03-21

## 2021-05-17 RX ORDER — TOLTERODINE 4 MG/1
4 CAPSULE, EXTENDED RELEASE ORAL DAILY
COMMUNITY
Start: 2021-02-01

## 2021-05-17 NOTE — ASSESSMENT & PLAN NOTE
Hypertension is newly identified.  Start low-dose amlodipine 2.5 mg daily.  Blood pressure will be reassessed in 4 weeks.

## 2021-05-17 NOTE — PROGRESS NOTES
"Chief Complaint  kidney problem, Dizziness, and Back Pain (lower back)    Subjective      History of Present Illness      Veronica Colby presents to [facility] to evaluate recent normal kidney function tests done by her psychiatrist.  She was recently on lithium and this was discontinued about 1 week ago.  Today her blood pressure is mildly elevated.  Her medication list has been reconciled during today's visit.  She is not having headaches.              Objective     Vital Signs:   /96   Pulse 103   Temp 97.8 °F (36.6 °C) (Tympanic)   Resp 16   Ht 177.8 cm (70\")   Wt 98.9 kg (218 lb)   SpO2 96%   BMI 31.28 kg/m²       Physical Exam  Vitals reviewed.   Constitutional:       General: She is not in acute distress.     Appearance: She is not diaphoretic.   Cardiovascular:      Rate and Rhythm: Normal rate.      Heart sounds: Normal heart sounds.   Pulmonary:      Effort: Pulmonary effort is normal.      Breath sounds: Normal breath sounds.   Neurological:      Mental Status: She is alert and oriented to person, place, and time.   Psychiatric:         Attention and Perception: She is attentive.         Speech: Speech normal.              Result Review :         Information below has been reviewed by Bhavik Aguilera M.D. on 05/17/2021.  Data Reviewed:  Recent labs from scanned data show elevated creatinine levels and normal thyroid levels.                 Assessment/Plan     Assessment and Plan      Diagnoses and all orders for this visit:    1. Essential hypertension (Primary)  Assessment & Plan:  Hypertension is newly identified.  Start low-dose amlodipine 2.5 mg daily.  Blood pressure will be reassessed in 4 weeks.    Orders:  -     amLODIPine (NORVASC) 2.5 MG tablet; Take 1 tablet by mouth Daily for 180 days.  Dispense: 90 tablet; Refill: 1    2. Elevated serum creatinine  Assessment & Plan:  Plan recheck labs prior to 4 week appointment.     Orders:  -     Basic Metabolic Panel; " Future          Follow Up   Return in about 1 month (around 6/17/2021) for recheck/refill medication.    Patient was given instructions and counseling regarding her condition or for health maintenance advice. Please see specific information pulled into the AVS if appropriate.

## 2021-08-31 ENCOUNTER — TELEPHONE (OUTPATIENT)
Dept: FAMILY MEDICINE CLINIC | Facility: CLINIC | Age: 46
End: 2021-08-31

## 2021-09-01 NOTE — TELEPHONE ENCOUNTER
Certainly ok for her to use nicotine transdermal patches OTC instead of smoking. Send her information about 1-800-QUIT NOW phone number for further assistance in this matter. Dr. Aguilera

## 2021-09-05 DIAGNOSIS — J45.20 MILD INTERMITTENT ASTHMA WITHOUT COMPLICATION: ICD-10-CM

## 2021-09-07 RX ORDER — ALBUTEROL SULFATE 90 UG/1
AEROSOL, METERED RESPIRATORY (INHALATION)
Qty: 18 G | OUTPATIENT
Start: 2021-09-07

## 2021-11-01 RX ORDER — ARIPIPRAZOLE 30 MG/1
30 TABLET ORAL DAILY
Qty: 30 TABLET | Refills: 0 | Status: SHIPPED | OUTPATIENT
Start: 2021-11-01 | End: 2021-11-18

## 2021-11-01 NOTE — TELEPHONE ENCOUNTER
Caller: Veronica Colby    Relationship: Self    Requested Prescriptions:   Requested Prescriptions     Pending Prescriptions Disp Refills   • ARIPiprazole (ABILIFY) 30 MG tablet  4     Sig: Take  by mouth Daily.        Pharmacy where request should be sent:   [id number] [address]  AT [facility] - [phone] [facility] [phone]   [phone]    Additional details provided by patient:   PATIENT MADE APPT FOR MED CHECK 11/18, REQUESTING REFILL TO LAST UNTIL APPT     Best call back number: [phone]    Does the patient have less than a 3 day supply:  [x] Yes  [] No    Lyla Rivas Rep   11/01/21 13:04 EDT

## 2021-11-02 DIAGNOSIS — I10 ESSENTIAL HYPERTENSION: ICD-10-CM

## 2021-11-02 RX ORDER — AMLODIPINE BESYLATE 2.5 MG/1
TABLET ORAL
Qty: 90 TABLET | Refills: 1 | Status: SHIPPED | OUTPATIENT
Start: 2021-11-02 | End: 2021-11-18 | Stop reason: SDUPTHER

## 2021-11-18 ENCOUNTER — OFFICE VISIT (OUTPATIENT)
Dept: FAMILY MEDICINE CLINIC | Facility: CLINIC | Age: 46
End: 2021-11-18

## 2021-11-18 VITALS
TEMPERATURE: 97.8 F | BODY MASS INDEX: 31.21 KG/M2 | HEIGHT: 70 IN | DIASTOLIC BLOOD PRESSURE: 86 MMHG | SYSTOLIC BLOOD PRESSURE: 120 MMHG | OXYGEN SATURATION: 97 % | RESPIRATION RATE: 16 BRPM | HEART RATE: 91 BPM | WEIGHT: 218 LBS

## 2021-11-18 DIAGNOSIS — Z12.11 ENCOUNTER FOR COLORECTAL CANCER SCREENING: ICD-10-CM

## 2021-11-18 DIAGNOSIS — K14.0 GLOSSITIS: ICD-10-CM

## 2021-11-18 DIAGNOSIS — R79.89 ELEVATED SERUM CREATININE: ICD-10-CM

## 2021-11-18 DIAGNOSIS — Z11.59 NEED FOR HEPATITIS C SCREENING TEST: ICD-10-CM

## 2021-11-18 DIAGNOSIS — Z23 IMMUNIZATION DUE: ICD-10-CM

## 2021-11-18 DIAGNOSIS — I10 ESSENTIAL HYPERTENSION: Primary | ICD-10-CM

## 2021-11-18 DIAGNOSIS — Z13.6 SCREENING FOR ISCHEMIC HEART DISEASE: ICD-10-CM

## 2021-11-18 DIAGNOSIS — Z12.12 ENCOUNTER FOR COLORECTAL CANCER SCREENING: ICD-10-CM

## 2021-11-18 PROCEDURE — 90472 IMMUNIZATION ADMIN EACH ADD: CPT | Performed by: FAMILY MEDICINE

## 2021-11-18 PROCEDURE — 90686 IIV4 VACC NO PRSV 0.5 ML IM: CPT | Performed by: FAMILY MEDICINE

## 2021-11-18 PROCEDURE — 90715 TDAP VACCINE 7 YRS/> IM: CPT | Performed by: FAMILY MEDICINE

## 2021-11-18 PROCEDURE — 99214 OFFICE O/P EST MOD 30 MIN: CPT | Performed by: FAMILY MEDICINE

## 2021-11-18 PROCEDURE — 90471 IMMUNIZATION ADMIN: CPT | Performed by: FAMILY MEDICINE

## 2021-11-18 RX ORDER — ARIPIPRAZOLE 30 MG/1
30 TABLET ORAL DAILY
COMMUNITY

## 2021-11-18 RX ORDER — AMLODIPINE BESYLATE 2.5 MG/1
2.5 TABLET ORAL DAILY
Qty: 90 TABLET | Refills: 3 | Status: SHIPPED | OUTPATIENT
Start: 2021-11-18 | End: 2023-01-23

## 2021-11-18 RX ORDER — MULTIVITAMIN/IRON/FOLIC ACID 18MG-0.4MG
1 TABLET ORAL DAILY
Start: 2021-11-18

## 2021-11-18 NOTE — PROGRESS NOTES
"Chief Complaint  Hypertension    Subjective          Veronica presents to [facility] to refill medicine.  Her blood pressure is well controlled.  No side effects are reported.  Medication list has been reconciled during today's visit.  Labs are due          Objective   Vital Signs:   Vitals:    11/18/21 1137 11/18/21 1155   BP: 129/91 120/86   BP Location:  Right arm   Patient Position:  Sitting   Cuff Size:  Adult   Pulse: 91    Resp: 16    Temp: 97.8 °F (36.6 °C)    TempSrc: Skin    SpO2: 97%    Weight: 98.9 kg (218 lb)    Height: 177.8 cm (70\")         Physical Exam  Vitals reviewed.   Constitutional:       General: She is not in acute distress.  HENT:      Mouth/Throat:      Comments: Mild inflammation around the edge of the tongue noted.  Eyes:      General: Lids are normal.      Conjunctiva/sclera: Conjunctivae normal.   Neck:      Vascular: No carotid bruit.      Trachea: No tracheal deviation.   Cardiovascular:      Rate and Rhythm: Normal rate and regular rhythm.      Heart sounds: Normal heart sounds. No murmur heard.      Pulmonary:      Effort: Pulmonary effort is normal.      Breath sounds: Normal breath sounds.   Skin:     General: Skin is warm and dry.   Neurological:      Mental Status: She is alert. She is not disoriented.   Psychiatric:         Speech: Speech normal.         Behavior: Behavior normal. Behavior is cooperative.          Result Review :                Assessment and Plan    Diagnoses and all orders for this visit:    1. Essential hypertension (Primary)  Assessment & Plan:  Condition is stable. The current medical regimen is effective;  continue present plan and medications.    Orders:  -     amLODIPine (NORVASC) 2.5 MG tablet; Take 1 tablet by mouth Daily.  Dispense: 90 tablet; Refill: 3  -     Comprehensive Metabolic Panel; Future  -     CBC & Differential; Future    2. Glossitis  Assessment & Plan:  New problem as of this morning.  Advised patient to start " taking multivitamin once daily.      3. Elevated serum creatinine  Assessment & Plan:  Plan recheck elevated serum creatinine with labs.      4. Encounter for colorectal cancer screening  -     Cologuard - Stool, Per Rectum; Future    5. Immunization due  -     FluLaval/Fluarix/Fluzone >6 Months ([id number])  -     Tdap Vaccine Greater Than or Equal To 6yo IM    6. Screening for ischemic heart disease  -     Comprehensive Metabolic Panel; Future  -     CBC & Differential; Future  -     Lipid Panel; Future  -     CK; Future    7. Need for hepatitis C screening test  -     Hepatitis C Antibody; Future      Follow Up   Return in about 1 year (around 11/18/2022) for Adult wellness & medication appt, schedule 30 min.  Patient was given instructions and counseling regarding her condition or for health maintenance advice. Please see specific information pulled into the AVS if appropriate.

## 2021-11-18 NOTE — PROGRESS NOTES
Immunization  Immunization performed in LD & [facility] by Severiano Gipson MA. Patient tolerated the procedure well without complications.  11/18/21   Severiano Gipson MA

## 2022-04-25 DIAGNOSIS — I10 ESSENTIAL HYPERTENSION: ICD-10-CM

## 2022-04-25 RX ORDER — AMLODIPINE BESYLATE 2.5 MG/1
2.5 TABLET ORAL DAILY
Qty: 90 TABLET | Refills: 3 | OUTPATIENT
Start: 2022-04-25 | End: 2023-04-25

## 2022-10-27 ENCOUNTER — OFFICE VISIT (OUTPATIENT)
Dept: FAMILY MEDICINE CLINIC | Facility: CLINIC | Age: 47
End: 2022-10-27

## 2022-10-27 VITALS
BODY MASS INDEX: 27.2 KG/M2 | HEIGHT: 70 IN | TEMPERATURE: 97.2 F | HEART RATE: 97 BPM | DIASTOLIC BLOOD PRESSURE: 75 MMHG | WEIGHT: 190 LBS | SYSTOLIC BLOOD PRESSURE: 108 MMHG | RESPIRATION RATE: 16 BRPM | OXYGEN SATURATION: 100 %

## 2022-10-27 DIAGNOSIS — R10.13 EPIGASTRIC PAIN: Primary | ICD-10-CM

## 2022-10-27 DIAGNOSIS — R63.4 ABNORMAL WEIGHT LOSS: ICD-10-CM

## 2022-10-27 PROCEDURE — 99214 OFFICE O/P EST MOD 30 MIN: CPT | Performed by: FAMILY MEDICINE

## 2022-10-27 NOTE — PROGRESS NOTES
"Chief Complaint  Abdominal Pain    Subjective          Veronica presents to [facility]  Due to recent issue that occurred last week.  Patient developed some generalized abdomen discomfort that seem to radiate upwards towards the chest.  It was associated with some panic feelings.  She will contact her therapist about medications.  She has had over 20 pounds of weight loss since stopping lithium a year ago.  Since the episode her pain is now returned.  The pain did last approximately one half an hour.  It occurred at 10 in the morning before she had eaten anything.  No reported new medications prior to this episode.        Objective   Vital Signs:   Vitals:    10/27/22 1555   BP: 108/75   Pulse: 97   Resp: 16   Temp: 97.2 °F (36.2 °C)   TempSrc: Skin   SpO2: 100%   Weight: 86.2 kg (190 lb)   Height: 177.8 cm (70\")                Physical Exam  Vitals reviewed.   Constitutional:       General: She is not in acute distress.  Abdominal:      General: Abdomen is flat. Bowel sounds are normal.      Palpations: Abdomen is soft.      Tenderness: There is no abdominal tenderness.          Result Review :                Assessment and Plan    Diagnoses and all orders for this visit:    1. Epigastric pain (Primary)  Comments:  Avoid nonsteroidal anti-inflammatories.  Referral to GI.  Complete abdomen ultrasound.  Orders:  -     Ambulatory Referral to Gastroenterology  -     US Abdomen Complete; Future  -     US Pelvis Complete; Future    2. Abnormal weight loss  Comments:  Weight loss most likely due to stopping the lithium.  However she does need colonoscopy.  We will do abdomen and pelvic ultrasound complete  Orders:  -     Ambulatory Referral to Gastroenterology  -     US Abdomen Complete; Future  -     US Pelvis Complete; Future        Follow Up   Return if symptoms worsen or fail to improve.  Patient was given instructions and counseling regarding her condition or for health maintenance advice. " Please see specific information pulled into the AVS if appropriate.

## 2022-11-01 ENCOUNTER — TELEPHONE (OUTPATIENT)
Dept: FAMILY MEDICINE CLINIC | Facility: CLINIC | Age: 47
End: 2022-11-01

## 2022-11-01 NOTE — TELEPHONE ENCOUNTER
PATIENT CALLED IN REGARDS TO PELVIC AND ABDOMEN ULTRA SOUND. IT WAS SUGGESTED AND SHE STATES HER STOMACH IS NOT BOTHERING HER ANY LONGER AND WILL NOT HAVE ULTRA SOUND DONE AT THIS TIME.

## 2022-11-04 ENCOUNTER — APPOINTMENT (OUTPATIENT)
Dept: ULTRASOUND IMAGING | Facility: HOSPITAL | Age: 47
End: 2022-11-04

## 2023-01-22 DIAGNOSIS — I10 ESSENTIAL HYPERTENSION: ICD-10-CM

## 2023-01-23 RX ORDER — AMLODIPINE BESYLATE 2.5 MG/1
2.5 TABLET ORAL DAILY
Qty: 90 TABLET | Refills: 0 | Status: SHIPPED | OUTPATIENT
Start: 2023-01-23 | End: 2024-01-23

## 2023-01-23 NOTE — TELEPHONE ENCOUNTER
Please advise.  Thanks    Rx Refill Note  Requested Prescriptions     Pending Prescriptions Disp Refills   • amLODIPine (NORVASC) 2.5 MG tablet [Pharmacy Med Name: AMLODIPINE BESYLATE 2.5MG TABLETS] 90 tablet 3     Sig: TAKE 1 TABLET BY MOUTH DAILY      Last office visit with prescribing clinician: 10/27/2022   Last telemedicine visit with prescribing clinician: Visit date not found   Next office visit with prescribing clinician: Visit date not found                         Would you like a call back once the refill request has been completed: [] Yes [] No    If the office needs to give you a call back, can they leave a voicemail: [] Yes [] No    Severiano Gipson MA  01/23/23, 11:30 EST

## 2023-04-23 DIAGNOSIS — I10 ESSENTIAL HYPERTENSION: ICD-10-CM

## 2023-04-24 RX ORDER — AMLODIPINE BESYLATE 2.5 MG/1
2.5 TABLET ORAL DAILY
Qty: 30 TABLET | Refills: 0 | Status: SHIPPED | OUTPATIENT
Start: 2023-04-24 | End: 2024-04-23

## 2023-04-24 NOTE — TELEPHONE ENCOUNTER
Rx Refill Note  Requested Prescriptions     Pending Prescriptions Disp Refills   • amLODIPine (NORVASC) 2.5 MG tablet [Pharmacy Med Name: AMLODIPINE BESYLATE 2.5MG TABLETS] 90 tablet 0     Sig: TAKE 1 TABLET BY MOUTH DAILY      Last office visit with prescribing clinician: 10/27/2022   Next office visit with prescribing clinician: Visit date not found

## 2023-05-21 DIAGNOSIS — I10 ESSENTIAL HYPERTENSION: ICD-10-CM

## 2023-05-22 RX ORDER — AMLODIPINE BESYLATE 2.5 MG/1
2.5 TABLET ORAL DAILY
Qty: 7 TABLET | Refills: 0 | Status: SHIPPED | OUTPATIENT
Start: 2023-05-22 | End: 2024-05-21

## 2023-05-22 NOTE — TELEPHONE ENCOUNTER
Rx Refill Note  Requested Prescriptions     Pending Prescriptions Disp Refills   • amLODIPine (NORVASC) 2.5 MG tablet [Pharmacy Med Name: AMLODIPINE BESYLATE 2.5MG TABLETS] 30 tablet 0     Sig: TAKE 1 TABLET BY MOUTH DAILY      Last office visit with prescribing clinician: 11/18/2021  Next office visit with prescribing clinician: Visit date not found

## 2023-06-01 ENCOUNTER — OFFICE VISIT (OUTPATIENT)
Dept: FAMILY MEDICINE CLINIC | Facility: CLINIC | Age: 48
End: 2023-06-01

## 2023-06-01 VITALS
BODY MASS INDEX: 27.77 KG/M2 | OXYGEN SATURATION: 100 % | SYSTOLIC BLOOD PRESSURE: 116 MMHG | HEIGHT: 70 IN | TEMPERATURE: 98.1 F | HEART RATE: 77 BPM | DIASTOLIC BLOOD PRESSURE: 84 MMHG | RESPIRATION RATE: 18 BRPM | WEIGHT: 194 LBS

## 2023-06-01 DIAGNOSIS — Z12.12 ENCOUNTER FOR COLORECTAL CANCER SCREENING: ICD-10-CM

## 2023-06-01 DIAGNOSIS — Z12.11 ENCOUNTER FOR COLORECTAL CANCER SCREENING: ICD-10-CM

## 2023-06-01 DIAGNOSIS — Z72.0 TOBACCO ABUSE DISORDER: ICD-10-CM

## 2023-06-01 DIAGNOSIS — Z23 IMMUNIZATION DUE: ICD-10-CM

## 2023-06-01 DIAGNOSIS — I10 ESSENTIAL HYPERTENSION: Primary | ICD-10-CM

## 2023-06-01 RX ORDER — AMLODIPINE BESYLATE 2.5 MG/1
2.5 TABLET ORAL DAILY
Qty: 90 TABLET | Refills: 3 | Status: SHIPPED | OUTPATIENT
Start: 2023-06-01 | End: 2024-05-31

## 2023-06-01 RX ORDER — CLOZAPINE 50 MG/1
1 TABLET ORAL 3 TIMES DAILY
COMMUNITY
Start: 2023-05-03

## 2023-06-01 NOTE — PROGRESS NOTES
Immunization  Immunization performed in LD by Isabella Zepeda MA. Patient tolerated the procedure well without complications.  06/01/23   Isabella Zepeda MA

## 2023-06-01 NOTE — PROGRESS NOTES
"Chief Complaint  Hypertension (Med refills)    Subjective        HPI   Veronica presents to [facility] for medication refill.  Blood pressure controlled.  Patient due for colon cancer screening.  She is also due for Prevnar.  She is at increased risk due to history of smoking.  She started when she was 16 and currently smoking 1 and half packs of cigarettes per day.  No shortness of breath reported.          Objective   Vital Signs:   Vitals:    06/01/23 1136   BP: 116/84   Pulse: 77   Resp: 18   Temp: 98.1 °F (36.7 °C)   SpO2: 100%   Weight: 88 kg (194 lb)   Height: 177.8 cm (70\")                Physical Exam  Vitals reviewed.   Constitutional:       General: She is not in acute distress.  Eyes:      General: Lids are normal.      Conjunctiva/sclera: Conjunctivae normal.   Neck:      Vascular: No carotid bruit.      Trachea: No tracheal deviation.   Cardiovascular:      Rate and Rhythm: Normal rate and regular rhythm.      Heart sounds: Normal heart sounds. No murmur heard.  Pulmonary:      Effort: Pulmonary effort is normal.      Breath sounds: Normal breath sounds.   Skin:     General: Skin is warm and dry.   Neurological:      Mental Status: She is alert. She is not disoriented.   Psychiatric:         Speech: Speech normal.         Behavior: Behavior normal. Behavior is cooperative.          Result Review :                Assessment and Plan    Diagnoses and all orders for this visit:    1. Essential hypertension (Primary)  Assessment & Plan:  The current medical regimen is effective;  continue present plan and medications.      Orders:  -     amLODIPine (NORVASC) 2.5 MG tablet; Take 1 tablet by mouth Daily.  Dispense: 90 tablet; Refill: 3    2. Encounter for colorectal cancer screening  -     Cologuard - Stool, Per Rectum; Future    3. Immunization due  -     Pneumococcal Conjugate Vaccine 20-Valent (PCV20)    4. Tobacco abuse disorder  Assessment & Plan:  Smoking cessation " encouraged. Pt not ready yet.  Information shared in after visit summary.     Orders:  -     Pneumococcal Conjugate Vaccine 20-Valent (PCV20)      Follow Up   Return for Adult wellness & medication appt, schedule 30 min.  Patient was given instructions and counseling regarding her condition or for health maintenance advice. Please see specific information pulled into the AVS if appropriate.

## 2024-05-20 DIAGNOSIS — I10 ESSENTIAL HYPERTENSION: ICD-10-CM

## 2024-05-21 DIAGNOSIS — I10 ESSENTIAL HYPERTENSION: ICD-10-CM

## 2024-05-21 RX ORDER — AMLODIPINE BESYLATE 2.5 MG/1
2.5 TABLET ORAL DAILY
Qty: 30 TABLET | Refills: 0 | Status: SHIPPED | OUTPATIENT
Start: 2024-05-21 | End: 2025-05-21

## 2024-05-21 RX ORDER — AMLODIPINE BESYLATE 2.5 MG/1
2.5 TABLET ORAL DAILY
Qty: 90 TABLET | OUTPATIENT
Start: 2024-05-21 | End: 2025-05-21

## 2024-05-21 NOTE — TELEPHONE ENCOUNTER
Caller: Veronica Colby    Relationship to patient: Self    Best call back number: [phone]    Patient is needing: SHE IS COMPLETELY OUT OF MEDICATION

## 2024-05-21 NOTE — TELEPHONE ENCOUNTER
Rx Refill Note  Requested Prescriptions     Pending Prescriptions Disp Refills    amLODIPine (NORVASC) 2.5 MG tablet [Pharmacy Med Name: AMLODIPINE BESYLATE 2.5MG TABLETS] 90 tablet 3     Sig: TAKE 1 TABLET BY MOUTH DAILY      Last office visit with prescribing clinician: 6/1/2023   Last telemedicine visit with prescribing clinician: Visit date not found   Next office visit with prescribing clinician: Visit date not found                         Would you like a call back once the refill request has been completed: [] Yes [] No    If the office needs to give you a call back, can they leave a voicemail: [] Yes [] No    Jade Bray MA  05/21/24, 14:58 EDT

## 2024-06-07 RX ORDER — MULTIVITAMIN/IRON/FOLIC ACID 18MG-0.4MG
1 TABLET ORAL DAILY
Status: CANCELLED
Start: 2024-06-07

## 2024-06-10 ENCOUNTER — OFFICE VISIT (OUTPATIENT)
Dept: FAMILY MEDICINE CLINIC | Facility: CLINIC | Age: 49
End: 2024-06-10
Payer: COMMERCIAL

## 2024-06-10 VITALS
BODY MASS INDEX: 29.43 KG/M2 | TEMPERATURE: 97.5 F | DIASTOLIC BLOOD PRESSURE: 72 MMHG | HEIGHT: 70 IN | SYSTOLIC BLOOD PRESSURE: 130 MMHG | HEART RATE: 99 BPM | WEIGHT: 205.6 LBS | RESPIRATION RATE: 16 BRPM | OXYGEN SATURATION: 99 %

## 2024-06-10 DIAGNOSIS — I10 ESSENTIAL HYPERTENSION: ICD-10-CM

## 2024-06-10 DIAGNOSIS — Z72.0 TOBACCO ABUSE DISORDER: Primary | ICD-10-CM

## 2024-06-10 DIAGNOSIS — K14.0 GLOSSITIS: ICD-10-CM

## 2024-06-10 RX ORDER — AMLODIPINE BESYLATE 2.5 MG/1
2.5 TABLET ORAL DAILY
Qty: 90 TABLET | Refills: 4 | Status: SHIPPED | OUTPATIENT
Start: 2024-06-10 | End: 2025-09-03

## 2024-06-10 NOTE — PROGRESS NOTES
"Chief Complaint  Med Refill (Patient had surgery Feb.21 is still having some abdominal pain )    Subjective        HPI   History of Present Illness  The patient is a 48-year-old female who presents for routine regular visit time for med refills.    The patient is currently on a regimen of amlodipine, which she reports as effective without any adverse effects. She expresses a preference for a three-month supply of this medication.    Supplemental Information  She had surgery at the [facility] on 02/21/2024. She had a hernia removed, her left ovary and fallopian tubes removed. She had some cysts removed from her right ovary. She also had cysts on her left ovary. Everything was benign. She still experiences a little bit of sensitivity and pain in the abdomen area. Her bowel movements are normal. She denies any tenderness in the muscles. She denies any drainage from the surgery sites. Her gynecologist asked her if there was blood or burning sensation in her urine, and she said no. Her doctor told her that she does not need to see him again. She is seeing her OB for her regular Pap smear in a couple of months. She sees her psychiatrist about once every 6 weeks. She is not seeing her counselor anymore. She saw him for 12 years. She and her  go to [facility] every 28 days for clonazepam blood work so she can stay on that prescription. Her psychiatrist is a nurse practitioner over at [facility]. She has been chewing the NicoDerm gum. She does not exercise.          Vital Signs:   Vitals:    06/10/24 1147   BP: 130/72   BP Location: Right arm   Patient Position: Sitting   Pulse: 99   Resp: 16   Temp: 97.5 °F (36.4 °C)   TempSrc: Oral   SpO2: 99%   Weight: 93.3 kg (205 lb 9.6 oz)  Comment: 205.6lbs   Height: 177.8 cm (70\")            10/27/2022     4:05 PM   PHQ-2/PHQ-9 Depression Screening   Little Interest or Pleasure in Doing Things 0-->not at all   Feeling Down, Depressed or " Hopeless 0-->not at all   PHQ-9: Brief Depression Severity Measure Score 0       BMI is >= 25 and <30. (Overweight) The following options were offered after discussion;: exercise counseling/recommendations and nutrition counseling/recommendations        Physical Exam  Physical Exam  Pupils are equal and round. Sclerae are clear. Conjunctiva is clear.  Thyroid gland is normal. No goiter. No thyroid nodules palpable. No carotid bruits auscultated.  Lungs are clear. No wheezes auscultated.  Heart has a regular rate and rhythm. No murmur, rub, or gallop.  Abdomen is soft. No guarding and no rebound.  No pretibial edema.    Vital Signs  Blood pressure is 130/72. BMI is 29.5.     Veronica Colby  reports that she has been smoking cigarettes. She has never used smokeless tobacco. I have educated her on the risk of diseases from using tobacco products such as cancer, COPD, and heart disease.     I advised her to quit and she is willing to quit. We have discussed the following method/s for tobacco cessation:  Education Material, Counseling, and OTC Cessation Products.  I spent 7 minutes counseling the patient.                Results                  Assessment and Plan        New Medications Ordered This Visit   Medications    amLODIPine (NORVASC) 2.5 MG tablet     Sig: Take 1 tablet by mouth Daily.     Dispense:  90 tablet     Refill:  4     appt     Assessment & Plan  1. Essential hypertension, controlled.  The patient is advised to persist with the daily regimen of amlodipine. A follow-up appointment is scheduled for 1 year from now.    2. Tobacco abuse disorder.  It is recommended that the patient cease tobacco use entirely. Information regarding the Quit Now program has been shared in the after-visit summary.         Patient was given instructions and counseling regarding her condition or for health maintenance advice. Please see specific information pulled into the AVS if appropriate.     Patient or patient representative  verbalized consent for the use of Ambient Listening during the visit with  Bhavik Aguilera MD for chart documentation. 6/10/2024  12:06 EDT

## 2025-06-15 DIAGNOSIS — I10 ESSENTIAL HYPERTENSION: ICD-10-CM

## 2025-06-16 DIAGNOSIS — I10 ESSENTIAL HYPERTENSION: ICD-10-CM

## 2025-06-16 RX ORDER — AMLODIPINE BESYLATE 2.5 MG/1
2.5 TABLET ORAL DAILY
Qty: 30 TABLET | Refills: 0 | Status: SHIPPED | OUTPATIENT
Start: 2025-06-16

## 2025-06-16 RX ORDER — AMLODIPINE BESYLATE 2.5 MG/1
2.5 TABLET ORAL DAILY
Qty: 90 TABLET | Refills: 0 | OUTPATIENT
Start: 2025-06-16

## 2025-07-13 DIAGNOSIS — I10 ESSENTIAL HYPERTENSION: ICD-10-CM

## 2025-07-14 RX ORDER — AMLODIPINE BESYLATE 2.5 MG/1
2.5 TABLET ORAL DAILY
Qty: 14 TABLET | Refills: 0 | Status: SHIPPED | OUTPATIENT
Start: 2025-07-14 | End: 2025-07-21 | Stop reason: SDUPTHER

## 2025-07-21 ENCOUNTER — OFFICE VISIT (OUTPATIENT)
Dept: FAMILY MEDICINE CLINIC | Facility: CLINIC | Age: 50
End: 2025-07-21
Payer: COMMERCIAL

## 2025-07-21 VITALS
DIASTOLIC BLOOD PRESSURE: 70 MMHG | WEIGHT: 202 LBS | HEIGHT: 70 IN | SYSTOLIC BLOOD PRESSURE: 122 MMHG | OXYGEN SATURATION: 97 % | HEART RATE: 94 BPM | BODY MASS INDEX: 28.92 KG/M2

## 2025-07-21 DIAGNOSIS — Z12.11 ENCOUNTER FOR SCREENING FOR MALIGNANT NEOPLASM OF COLON: ICD-10-CM

## 2025-07-21 DIAGNOSIS — I10 ESSENTIAL HYPERTENSION: Primary | ICD-10-CM

## 2025-07-21 DIAGNOSIS — Z23 IMMUNIZATION DUE: ICD-10-CM

## 2025-07-21 DIAGNOSIS — Z72.0 TOBACCO ABUSE DISORDER: ICD-10-CM

## 2025-07-21 PROCEDURE — 99214 OFFICE O/P EST MOD 30 MIN: CPT | Performed by: FAMILY MEDICINE

## 2025-07-21 PROCEDURE — 99407 BEHAV CHNG SMOKING > 10 MIN: CPT | Performed by: FAMILY MEDICINE

## 2025-07-21 PROCEDURE — 90471 IMMUNIZATION ADMIN: CPT | Performed by: FAMILY MEDICINE

## 2025-07-21 PROCEDURE — 90750 HZV VACC RECOMBINANT IM: CPT | Performed by: FAMILY MEDICINE

## 2025-07-21 RX ORDER — SOLIFENACIN SUCCINATE 10 MG/1
10 TABLET, FILM COATED ORAL DAILY
COMMUNITY

## 2025-07-21 RX ORDER — DOXYCYCLINE 100 MG/1
100 CAPSULE ORAL 2 TIMES DAILY
COMMUNITY

## 2025-07-21 RX ORDER — NICOTINE 21 MG/24HR
1 PATCH, TRANSDERMAL 24 HOURS TRANSDERMAL EVERY 24 HOURS
COMMUNITY
Start: 2025-07-21

## 2025-07-21 RX ORDER — AMLODIPINE BESYLATE 2.5 MG/1
2.5 TABLET ORAL DAILY
Qty: 90 TABLET | Refills: 3 | Status: SHIPPED | OUTPATIENT
Start: 2025-07-21 | End: 2026-07-21

## 2025-07-21 RX ORDER — OMEPRAZOLE 20 MG/1
20 CAPSULE, DELAYED RELEASE ORAL DAILY
COMMUNITY

## 2025-07-21 NOTE — PROGRESS NOTES
"Chief Complaint  Med Refill and Hypertension    Subjective        HPI   Veronica Colby  reports that she has been smoking cigarettes. She has never used smokeless tobacco. I have educated her on the risk of diseases from using tobacco products such as cancer, COPD, and heart disease.     I advised her to quit and she is willing to quit. We have discussed the following method/s for tobacco cessation:  Education Material, Counseling, and OTC Cessation Products.  Together we have set a quit date for within the next 3 months.  She will follow up with me in 4 months or sooner to check on her progress.    I spent >10 minutes counseling the patient.           The patient presents for an annual visit to renew her blood pressure medication.    She is here for her annual check-up and to renew her amlodipine. She discontinued Centrum. Her IUD was removed post-menopause after 10 years. She had surgery to remove an ovary and fallopian tubes. She is taking Gemtesa for bladder control. She gets labs done every 56 days at [facility] for clozapine, but it is actually 28 days apart. The lab results go to her psychiatric nurse practitioner, Venkat, who orders a CBC.    She is interested in using NicoDerm patches to quit smoking, as she feels her health is deteriorating due to her smoking habit. She smokes at least a pack a day. She planned to quit on 07/04/2025, then considered her birthday on 11/01/2027, but has been postponing it. She is now considering quitting within the next 3 months.    SOCIAL HISTORY  Smokes at least a pack a day.    MEDICATIONS  Current: Amlodipine, Gemtesa, clozapine.  Discontinued: Centrum.           Vital Signs:   Vitals:    07/21/25 0942   BP: 122/70   Pulse: 94   SpO2: 97%   Weight: 91.6 kg (202 lb)   Height: 177.8 cm (70\")            7/21/2025     9:46 AM   PHQ-2/PHQ-9 Depression Screening   Little interest or pleasure in doing things Not at all   Feeling down, depressed, or hopeless Not at all             "   Physical Exam     General Appearance: Normal appearance, No acute distress.  Vital signs: Blood pressure: 122/70.  Respiratory: No respiratory distress, lungs clear to auscultation with no wheezes or rubs.  Extremities: no pretibial edema bilaterally.  Psychiatric: normal affect, pleasant, cooperative with exam.  Other observations: no carotid bruits auscultated bilaterally.             Results  - Laboratory Studies:    - Blood sugar: 107 (2023)                Assessment and Plan      1. Essential hypertension.  Well-managed with current regimen. Prescription for amlodipine sent to [facility].    2. Health maintenance.  Due for colon cancer screening. First dose of Shingrix vaccine administered today. Cologuard test ordered. Second dose of Shingrix vaccine recommended during follow-up in 4 months.    3. Tobacco cessation.  Discussed tobacco cessation. Patient committed to quitting within 3 months. NicoDerm patch 21 mg for 2 weeks, followed by 14 mg for 2 weeks, then 7 mg for 2 weeks discussed. Supplement with 4 mg lozenges, up to 20 per day.    Follow-up  Scheduled in 4 months.         Essential hypertension    Essential hypertension is controlled.  Continue with amlodipine.  She is due for screening labs  Orders:    amLODIPine (NORVASC) 2.5 MG tablet; Take 1 tablet by mouth Daily.    Comprehensive Metabolic Panel; Future    Lipid Panel With / Chol / HDL Ratio; Future    TSH; Future    Encounter for screening for malignant neoplasm of colon  Screening for colon cancer is due.  Cologuard test is ordered  Orders:    Cologuard - Stool, Per Rectum; Future    Immunization due  We did administer the first of 2 Shingrix vaccines.  Will recommend the second 1 in the RR follow-up visit in 4 months  Orders:    Shingrix Vaccine    Tobacco abuse disorder  Long discussion regarding tobacco cessation.  Patient is committed to considering a quit date within the next 3 months.  We talked extensively about the use of  NicoDerm patch 21 mg for 2 weeks followed by 14 mg for 2 weeks followed by 7 mg for 2 weeks.  She will supplement her craving for smoking with nicotine lozenges 4 mg up to 20 lozenges/day.  Orders:    nicotine (Nicoderm CQ) 21 MG/24HR patch; Place 1 patch on the skin as directed by provider Daily.    nicotine polacrilex (Nicotine Mini) 4 MG lozenge; Dissolve 1 lozenge in the mouth As Needed for Smoking Cessation.       Return in about 4 months (around 11/21/2025) for recheck of current condition of smoking cessation.     Patient was given instructions and counseling regarding her condition or for health maintenance advice. Please see specific information pulled into the AVS if appropriate.     Patient or patient representative verbalized consent for the use of Ambient Listening during the visit with  Bhavik Aguilera MD for chart documentation. 7/21/2025  10:14 EDT

## 2025-07-21 NOTE — LETTER
[facility]  Vaccine Consent Form    Patient Name:  Veronica Colby  Patient :  1975     Vaccine(s) Ordered    Shingrix Vaccine        Screening Checklist  The following questions should be completed prior to vaccination. If you answer “yes” to any question, it does not necessarily mean you should not be vaccinated. It just means we may need to clarify or ask more questions. If a question is unclear, please ask your healthcare provider to explain it.    Yes No   Any fever or moderate to severe illness today (mild illness and/or antibiotic treatment are not contraindications)?     Do you have a history of a serious reaction to any previous vaccinations, such as anaphylaxis, encephalopathy within 7 days, Guillain-[place] syndrome within 6 weeks, seizure?     Have you received any live vaccine(s) (e.g MMR, LUIS ALFREDO) or any other vaccines in the last month (to ensure duplicate doses aren't given)?     Do you have an anaphylactic allergy to latex (DTaP, DTaP-IPV, Hep A, Hep B, MenB, RV, Td, Tdap), baker’s yeast (Hep B, HPV), polysorbates (RSV, nirsevimab, PCV 20 and 21, Rotavirrus, Tdap, Shingrix), or gelatin (LUIS ALFREDO, MMR)?     Do you have an anaphylactic allergy to neomycin (Rabies, LUIS ALFREDO, MMR, IPV, Hep A), polymyxin B (IPV), or streptomycin (IPV)?      Any cancer, leukemia, AIDS, or other immune system disorder? (LUIS ALFREDO, MMR, RV)     Do you have a parent, brother, or sister with an immune system problem (if immune competence of vaccine recipient clinically verified, can proceed)? (MMR, LUIS ALFREDO)     Any recent steroid treatments for >2 weeks, chemotherapy, or radiation treatment? (LUIS ALFREDO, MMR)     Have you received antibody-containing blood transfusions or IVIG in the past 11 months (recommended interval is dependent on product)? (MMR, LUIS ALFREDO)     Have you taken antiviral drugs (acyclovir, famciclovir, valacyclovir for LUIS ALFREDO) in the last 24 or 48 hours, respectively?      Are you pregnant or planning to become pregnant within 1 month?  "(LUIS ALFREDO, MMR, HPV, IPV, MenB, Abrexvy; For Hep B- refer to Engerix-B; For RSV - Abrysvo is indicated for 32-36 weeks of pregnancy from September to January)     For infants, have you ever been told your child has had intussusception or a medical emergency involving obstruction of the intestine (Rotavirus)? If not for an infant, can skip this question.         *Ordering Physicians/APC should be consulted if \"yes\" is checked by the patient or guardian above.  I have received, read, and understand the Vaccine Information Statement (VIS) for each vaccine ordered.  I have considered my or my child's health status as well as the health status of my close contacts.  I have taken the opportunity to discuss my vaccine questions with my or my child's health care provider.   I have requested that the ordered vaccine(s) be given to me or my child.  I understand the benefits and risks of the vaccines.  I understand that I should remain in the clinic for 15 minutes after receiving the vaccine(s).  _________________________________________________________  Signature of Patient or Parent/Legal Guardian ____________________  Date   "

## 2025-07-21 NOTE — ASSESSMENT & PLAN NOTE
Long discussion regarding tobacco cessation.  Patient is committed to considering a quit date within the next 3 months.  We talked extensively about the use of NicoDerm patch 21 mg for 2 weeks followed by 14 mg for 2 weeks followed by 7 mg for 2 weeks.  She will supplement her craving for smoking with nicotine lozenges 4 mg up to 20 lozenges/day.  Orders:    nicotine (Nicoderm CQ) 21 MG/24HR patch; Place 1 patch on the skin as directed by provider Daily.    nicotine polacrilex (Nicotine Mini) 4 MG lozenge; Dissolve 1 lozenge in the mouth As Needed for Smoking Cessation.

## 2025-07-21 NOTE — ASSESSMENT & PLAN NOTE
{Hypertension is (optional):[id number]}  Essential hypertension is controlled.  Continue with amlodipine.  She is due for screening labs  Orders:    amLODIPine (NORVASC) 2.5 MG tablet; Take 1 tablet by mouth Daily.    Comprehensive Metabolic Panel; Future    Lipid Panel With / Chol / HDL Ratio; Future    TSH; Future